# Patient Record
Sex: MALE | Race: WHITE | NOT HISPANIC OR LATINO | Employment: FULL TIME | URBAN - METROPOLITAN AREA
[De-identification: names, ages, dates, MRNs, and addresses within clinical notes are randomized per-mention and may not be internally consistent; named-entity substitution may affect disease eponyms.]

---

## 2018-02-20 ENCOUNTER — HOSPITAL ENCOUNTER (INPATIENT)
Facility: HOSPITAL | Age: 38
LOS: 3 days | Discharge: HOME | End: 2018-02-23
Attending: EMERGENCY MEDICINE | Admitting: INTERNAL MEDICINE

## 2018-02-20 DIAGNOSIS — B16.9 ACUTE HEPATITIS B: Primary | ICD-10-CM

## 2018-02-20 DIAGNOSIS — A63.0 CONDYLOMA: ICD-10-CM

## 2018-02-20 PROBLEM — E86.0 DEHYDRATION: Status: ACTIVE | Noted: 2018-02-20

## 2018-02-20 PROBLEM — F19.10 IV DRUG ABUSE (HCC): Status: ACTIVE | Noted: 2018-02-20

## 2018-02-20 PROBLEM — B17.10 ACUTE HEPATITIS C WITHOUT HEPATIC COMA: Status: ACTIVE | Noted: 2018-02-20

## 2018-02-20 LAB
ALBUMIN SERPL-MCNC: 3.5 G/DL (ref 3.5–5.2)
ALBUMIN/GLOB SERPL: 0.8 G/DL
ALP SERPL-CCNC: 178 U/L (ref 39–117)
ALT SERPL W P-5'-P-CCNC: 935 U/L (ref 1–41)
AMMONIA BLD-SCNC: 50 UMOL/L (ref 16–60)
ANION GAP SERPL CALCULATED.3IONS-SCNC: 9 MMOL/L
AST SERPL-CCNC: 774 U/L (ref 1–40)
BASOPHILS # BLD AUTO: 0.06 10*3/MM3 (ref 0–0.2)
BASOPHILS NFR BLD AUTO: 0.4 % (ref 0–1.5)
BILIRUB SERPL-MCNC: 3.6 MG/DL (ref 0.1–1.2)
BILIRUB UR QL STRIP: NEGATIVE
BUN BLD-MCNC: 16 MG/DL (ref 6–20)
BUN/CREAT SERPL: 16.2 (ref 7–25)
CALCIUM SPEC-SCNC: 9.2 MG/DL (ref 8.6–10.5)
CHLORIDE SERPL-SCNC: 103 MMOL/L (ref 98–107)
CLARITY UR: CLEAR
CO2 SERPL-SCNC: 28 MMOL/L (ref 22–29)
COLOR UR: ABNORMAL
CREAT BLD-MCNC: 0.99 MG/DL (ref 0.76–1.27)
DEPRECATED RDW RBC AUTO: 58 FL (ref 37–54)
EOSINOPHIL # BLD AUTO: 0.23 10*3/MM3 (ref 0–0.7)
EOSINOPHIL NFR BLD AUTO: 1.6 % (ref 0.3–6.2)
ERYTHROCYTE [DISTWIDTH] IN BLOOD BY AUTOMATED COUNT: 17.1 % (ref 11.5–14.5)
GFR SERPL CREATININE-BSD FRML MDRD: 85 ML/MIN/1.73
GLOBULIN UR ELPH-MCNC: 4.2 GM/DL
GLUCOSE BLD-MCNC: 93 MG/DL (ref 65–99)
GLUCOSE UR STRIP-MCNC: NEGATIVE MG/DL
HCT VFR BLD AUTO: 46.3 % (ref 40.4–52.2)
HGB BLD-MCNC: 15.7 G/DL (ref 13.7–17.6)
HGB UR QL STRIP.AUTO: NEGATIVE
IMM GRANULOCYTES # BLD: 0.04 10*3/MM3 (ref 0–0.03)
IMM GRANULOCYTES NFR BLD: 0.3 % (ref 0–0.5)
INR PPP: 1.23 (ref 0.9–1.1)
KETONES UR QL STRIP: NEGATIVE
LEUKOCYTE ESTERASE UR QL STRIP.AUTO: NEGATIVE
LIPASE SERPL-CCNC: 104 U/L (ref 13–60)
LYMPHOCYTES # BLD AUTO: 2.57 10*3/MM3 (ref 0.9–4.8)
LYMPHOCYTES NFR BLD AUTO: 18.2 % (ref 19.6–45.3)
MCH RBC QN AUTO: 31.5 PG (ref 27–32.7)
MCHC RBC AUTO-ENTMCNC: 33.9 G/DL (ref 32.6–36.4)
MCV RBC AUTO: 92.8 FL (ref 79.8–96.2)
MONOCYTES # BLD AUTO: 1.5 10*3/MM3 (ref 0.2–1.2)
MONOCYTES NFR BLD AUTO: 10.6 % (ref 5–12)
NEUTROPHILS # BLD AUTO: 9.71 10*3/MM3 (ref 1.9–8.1)
NEUTROPHILS NFR BLD AUTO: 68.9 % (ref 42.7–76)
NITRITE UR QL STRIP: NEGATIVE
PH UR STRIP.AUTO: 6 [PH] (ref 5–8)
PLATELET # BLD AUTO: 280 10*3/MM3 (ref 140–500)
PMV BLD AUTO: 11.1 FL (ref 6–12)
POTASSIUM BLD-SCNC: 4.5 MMOL/L (ref 3.5–5.2)
PROT SERPL-MCNC: 7.7 G/DL (ref 6–8.5)
PROT UR QL STRIP: NEGATIVE
PROTHROMBIN TIME: 15.3 SECONDS (ref 11.7–14.2)
RBC # BLD AUTO: 4.99 10*6/MM3 (ref 4.6–6)
SODIUM BLD-SCNC: 140 MMOL/L (ref 136–145)
SP GR UR STRIP: 1.02 (ref 1–1.03)
UROBILINOGEN UR QL STRIP: ABNORMAL
WBC NRBC COR # BLD: 14.11 10*3/MM3 (ref 4.5–10.7)

## 2018-02-20 PROCEDURE — 82140 ASSAY OF AMMONIA: CPT | Performed by: NURSE PRACTITIONER

## 2018-02-20 PROCEDURE — 80053 COMPREHEN METABOLIC PANEL: CPT | Performed by: NURSE PRACTITIONER

## 2018-02-20 PROCEDURE — 99284 EMERGENCY DEPT VISIT MOD MDM: CPT

## 2018-02-20 PROCEDURE — 85025 COMPLETE CBC W/AUTO DIFF WBC: CPT | Performed by: NURSE PRACTITIONER

## 2018-02-20 PROCEDURE — 86592 SYPHILIS TEST NON-TREP QUAL: CPT | Performed by: NURSE PRACTITIONER

## 2018-02-20 PROCEDURE — 83690 ASSAY OF LIPASE: CPT | Performed by: NURSE PRACTITIONER

## 2018-02-20 PROCEDURE — 81003 URINALYSIS AUTO W/O SCOPE: CPT | Performed by: NURSE PRACTITIONER

## 2018-02-20 PROCEDURE — 85610 PROTHROMBIN TIME: CPT | Performed by: NURSE PRACTITIONER

## 2018-02-20 RX ORDER — GABAPENTIN 600 MG/1
600 TABLET ORAL 3 TIMES DAILY
COMMUNITY
End: 2018-02-20

## 2018-02-20 RX ORDER — SODIUM CHLORIDE 0.9 % (FLUSH) 0.9 %
10 SYRINGE (ML) INJECTION AS NEEDED
Status: DISCONTINUED | OUTPATIENT
Start: 2018-02-20 | End: 2018-02-23 | Stop reason: HOSPADM

## 2018-02-20 RX ORDER — SODIUM CHLORIDE 0.9 % (FLUSH) 0.9 %
1-10 SYRINGE (ML) INJECTION AS NEEDED
Status: DISCONTINUED | OUTPATIENT
Start: 2018-02-20 | End: 2018-02-23 | Stop reason: HOSPADM

## 2018-02-20 RX ORDER — SODIUM CHLORIDE 9 MG/ML
100 INJECTION, SOLUTION INTRAVENOUS CONTINUOUS
Status: DISCONTINUED | OUTPATIENT
Start: 2018-02-21 | End: 2018-02-22

## 2018-02-20 RX ADMIN — SODIUM CHLORIDE 1000 ML: 9 INJECTION, SOLUTION INTRAVENOUS at 20:30

## 2018-02-20 NOTE — ED TRIAGE NOTES
Pt reports being previous IV drug user- has been clean for 64 days> Pt reports he has been having intermittent jaundice for 7 months, worsening over last 2 weeks. Pt brought lab work from [facility] urgent [facility].

## 2018-02-21 ENCOUNTER — APPOINTMENT (OUTPATIENT)
Dept: CARDIOLOGY | Facility: HOSPITAL | Age: 38
End: 2018-02-21
Attending: INTERNAL MEDICINE

## 2018-02-21 ENCOUNTER — APPOINTMENT (OUTPATIENT)
Dept: ULTRASOUND IMAGING | Facility: HOSPITAL | Age: 38
End: 2018-02-21

## 2018-02-21 ENCOUNTER — APPOINTMENT (OUTPATIENT)
Dept: CARDIOLOGY | Facility: HOSPITAL | Age: 38
End: 2018-02-21
Attending: SURGERY

## 2018-02-21 ENCOUNTER — APPOINTMENT (OUTPATIENT)
Dept: CT IMAGING | Facility: HOSPITAL | Age: 38
End: 2018-02-21

## 2018-02-21 PROBLEM — I87.1 INFERIOR VENA CAVAL STENOSIS: Status: ACTIVE | Noted: 2018-02-21

## 2018-02-21 LAB
ALBUMIN SERPL-MCNC: 3 G/DL (ref 3.5–5.2)
ALBUMIN/GLOB SERPL: 0.8 G/DL
ALP SERPL-CCNC: 160 U/L (ref 39–117)
ALT SERPL W P-5'-P-CCNC: 730 U/L (ref 1–41)
ANION GAP SERPL CALCULATED.3IONS-SCNC: 12 MMOL/L
AORTIC ARCH: 2.6 CM
AORTIC DIMENSIONLESS INDEX: 0.7 (DI)
AST SERPL-CCNC: 593 U/L (ref 1–40)
BASOPHILS # BLD AUTO: 0.06 10*3/MM3 (ref 0–0.2)
BASOPHILS NFR BLD AUTO: 0.5 % (ref 0–1.5)
BH CV ECHO MEAS - ACS: 2.2 CM
BH CV ECHO MEAS - AO MEAN PG (FULL): 1 MMHG
BH CV ECHO MEAS - AO MEAN PG: 4 MMHG
BH CV ECHO MEAS - AO ROOT AREA (BSA CORRECTED): 1.6
BH CV ECHO MEAS - AO ROOT AREA: 7.1 CM^2
BH CV ECHO MEAS - AO ROOT DIAM: 3 CM
BH CV ECHO MEAS - AO V2 MAX: 122 CM/SEC
BH CV ECHO MEAS - AO V2 MEAN: 96 CM/SEC
BH CV ECHO MEAS - AO V2 VTI: 30 CM
BH CV ECHO MEAS - AVA(I,A): 2.3 CM^2
BH CV ECHO MEAS - AVA(I,D): 2.3 CM^2
BH CV ECHO MEAS - BSA(HAYCOCK): 1.9 M^2
BH CV ECHO MEAS - BSA: 1.9 M^2
BH CV ECHO MEAS - BZI_BMI: 22.1 KILOGRAMS/M^2
BH CV ECHO MEAS - BZI_METRIC_HEIGHT: 177.8 CM
BH CV ECHO MEAS - BZI_METRIC_WEIGHT: 69.9 KG
BH CV ECHO MEAS - CONTRAST EF (2CH): 43.5 ML/M^2
BH CV ECHO MEAS - CONTRAST EF 4CH: 45.3 ML/M^2
BH CV ECHO MEAS - EDV(CUBED): 85.2 ML
BH CV ECHO MEAS - EDV(MOD-SP2): 92 ML
BH CV ECHO MEAS - EDV(MOD-SP4): 86 ML
BH CV ECHO MEAS - EDV(TEICH): 87.7 ML
BH CV ECHO MEAS - EF(CUBED): 53.9 %
BH CV ECHO MEAS - EF(MOD-SP2): 43.5 %
BH CV ECHO MEAS - EF(MOD-SP4): 58 %
BH CV ECHO MEAS - EF(TEICH): 45.9 %
BH CV ECHO MEAS - ESV(CUBED): 39.3 ML
BH CV ECHO MEAS - ESV(MOD-SP2): 52 ML
BH CV ECHO MEAS - ESV(MOD-SP4): 47 ML
BH CV ECHO MEAS - ESV(TEICH): 47.4 ML
BH CV ECHO MEAS - FS: 22.7 %
BH CV ECHO MEAS - IVS/LVPW: 0.9
BH CV ECHO MEAS - IVSD: 0.9 CM
BH CV ECHO MEAS - LA DIMENSION: 2.8 CM
BH CV ECHO MEAS - LA/AO: 0.93
BH CV ECHO MEAS - LAT PEAK E' VEL: 12 CM/SEC
BH CV ECHO MEAS - LV DIASTOLIC VOL/BSA (35-75): 46 ML/M^2
BH CV ECHO MEAS - LV MASS(C)D: 137.8 GRAMS
BH CV ECHO MEAS - LV MASS(C)DI: 73.8 GRAMS/M^2
BH CV ECHO MEAS - LV MEAN PG: 3 MMHG
BH CV ECHO MEAS - LV SYSTOLIC VOL/BSA (12-30): 25.2 ML/M^2
BH CV ECHO MEAS - LV V1 MAX: 104 CM/SEC
BH CV ECHO MEAS - LV V1 MEAN: 77.1 CM/SEC
BH CV ECHO MEAS - LV V1 VTI: 20.1 CM
BH CV ECHO MEAS - LVIDD: 4.4 CM
BH CV ECHO MEAS - LVIDS: 3.4 CM
BH CV ECHO MEAS - LVLD AP2: 8.5 CM
BH CV ECHO MEAS - LVLD AP4: 8.7 CM
BH CV ECHO MEAS - LVLS AP2: 7.2 CM
BH CV ECHO MEAS - LVLS AP4: 6.8 CM
BH CV ECHO MEAS - LVOT AREA (M): 3.5 CM^2
BH CV ECHO MEAS - LVOT AREA: 3.5 CM^2
BH CV ECHO MEAS - LVOT DIAM: 2.1 CM
BH CV ECHO MEAS - LVPWD: 1 CM
BH CV ECHO MEAS - MED PEAK E' VEL: 11 CM/SEC
BH CV ECHO MEAS - MV A DUR: 0.1 SEC
BH CV ECHO MEAS - MV A MAX VEL: 56.3 CM/SEC
BH CV ECHO MEAS - MV DEC SLOPE: 278 CM/SEC^2
BH CV ECHO MEAS - MV DEC TIME: 0.2 SEC
BH CV ECHO MEAS - MV E MAX VEL: 79.5 CM/SEC
BH CV ECHO MEAS - MV E/A: 1.4
BH CV ECHO MEAS - MV MEAN PG: 1 MMHG
BH CV ECHO MEAS - MV P1/2T MAX VEL: 81.4 CM/SEC
BH CV ECHO MEAS - MV P1/2T: 85.8 MSEC
BH CV ECHO MEAS - MV V2 MEAN: 56.8 CM/SEC
BH CV ECHO MEAS - MV V2 VTI: 26.5 CM
BH CV ECHO MEAS - MVA P1/2T LCG: 2.7 CM^2
BH CV ECHO MEAS - MVA(P1/2T): 2.6 CM^2
BH CV ECHO MEAS - MVA(VTI): 2.6 CM^2
BH CV ECHO MEAS - PA ACC SLOPE: 0 CM/SEC^2
BH CV ECHO MEAS - PA ACC TIME: 0.1 SEC
BH CV ECHO MEAS - PA MAX PG (FULL): 1 MMHG
BH CV ECHO MEAS - PA MAX PG: 2.3 MMHG
BH CV ECHO MEAS - PA PR(ACCEL): 36.3 MMHG
BH CV ECHO MEAS - PA V2 MAX: 75.6 CM/SEC
BH CV ECHO MEAS - PULM A REVS DUR: 0.09 SEC
BH CV ECHO MEAS - PULM A REVS VEL: 34.1 CM/SEC
BH CV ECHO MEAS - PULM DIAS VEL: 50.8 CM/SEC
BH CV ECHO MEAS - PULM S/D: 1.2
BH CV ECHO MEAS - PULM SYS VEL: 61.7 CM/SEC
BH CV ECHO MEAS - PVA(V,A): 1.9 CM^2
BH CV ECHO MEAS - PVA(V,D): 1.9 CM^2
BH CV ECHO MEAS - QP/QS: 0.45
BH CV ECHO MEAS - RAP SYSTOLE: 3 MMHG
BH CV ECHO MEAS - RV MAX PG: 1.2 MMHG
BH CV ECHO MEAS - RV MEAN PG: 1 MMHG
BH CV ECHO MEAS - RV V1 MAX: 55.6 CM/SEC
BH CV ECHO MEAS - RV V1 MEAN: 40 CM/SEC
BH CV ECHO MEAS - RV V1 VTI: 12.2 CM
BH CV ECHO MEAS - RVOT AREA: 2.5 CM^2
BH CV ECHO MEAS - RVOT DIAM: 1.8 CM
BH CV ECHO MEAS - RVSP: 19 MMHG
BH CV ECHO MEAS - SI(AO): 113.5 ML/M^2
BH CV ECHO MEAS - SI(CUBED): 24.6 ML/M^2
BH CV ECHO MEAS - SI(LVOT): 37.3 ML/M^2
BH CV ECHO MEAS - SI(MOD-SP2): 21.4 ML/M^2
BH CV ECHO MEAS - SI(MOD-SP4): 20.9 ML/M^2
BH CV ECHO MEAS - SI(TEICH): 21.6 ML/M^2
BH CV ECHO MEAS - SUP REN AO DIAM: 1.58 CM
BH CV ECHO MEAS - SV(AO): 212.1 ML
BH CV ECHO MEAS - SV(CUBED): 45.9 ML
BH CV ECHO MEAS - SV(LVOT): 69.6 ML
BH CV ECHO MEAS - SV(MOD-SP2): 40 ML
BH CV ECHO MEAS - SV(MOD-SP4): 39 ML
BH CV ECHO MEAS - SV(RVOT): 31 ML
BH CV ECHO MEAS - SV(TEICH): 40.3 ML
BH CV ECHO MEAS - TAPSE (>1.6): 2.4 CM2
BH CV ECHO MEAS - TR MAX VEL: 202 CM/SEC
BH CV LOWER VASCULAR LEFT COMMON FEMORAL AUGMENT: NORMAL
BH CV LOWER VASCULAR LEFT COMMON FEMORAL COMPETENT: NORMAL
BH CV LOWER VASCULAR LEFT COMMON FEMORAL COMPRESS: NORMAL
BH CV LOWER VASCULAR LEFT COMMON FEMORAL PHASIC: NORMAL
BH CV LOWER VASCULAR LEFT COMMON FEMORAL SPONT: NORMAL
BH CV LOWER VASCULAR LEFT DISTAL FEMORAL COMPRESS: NORMAL
BH CV LOWER VASCULAR LEFT GASTRONEMIUS COMPRESS: NORMAL
BH CV LOWER VASCULAR LEFT GREATER SAPH AK COMPRESS: NORMAL
BH CV LOWER VASCULAR LEFT GREATER SAPH BK COMPRESS: NORMAL
BH CV LOWER VASCULAR LEFT MID FEMORAL AUGMENT: NORMAL
BH CV LOWER VASCULAR LEFT MID FEMORAL COMPETENT: NORMAL
BH CV LOWER VASCULAR LEFT MID FEMORAL COMPRESS: NORMAL
BH CV LOWER VASCULAR LEFT MID FEMORAL PHASIC: NORMAL
BH CV LOWER VASCULAR LEFT MID FEMORAL SPONT: NORMAL
BH CV LOWER VASCULAR LEFT PERONEAL COMPRESS: NORMAL
BH CV LOWER VASCULAR LEFT POPLITEAL AUGMENT: NORMAL
BH CV LOWER VASCULAR LEFT POPLITEAL COMPETENT: NORMAL
BH CV LOWER VASCULAR LEFT POPLITEAL COMPRESS: NORMAL
BH CV LOWER VASCULAR LEFT POPLITEAL PHASIC: NORMAL
BH CV LOWER VASCULAR LEFT POPLITEAL SPONT: NORMAL
BH CV LOWER VASCULAR LEFT POSTERIOR TIBIAL COMPRESS: NORMAL
BH CV LOWER VASCULAR LEFT PROXIMAL FEMORAL COMPRESS: NORMAL
BH CV LOWER VASCULAR LEFT SAPHENOFEMORAL JUNCTION AUGMENT: NORMAL
BH CV LOWER VASCULAR LEFT SAPHENOFEMORAL JUNCTION COMPETENT: NORMAL
BH CV LOWER VASCULAR LEFT SAPHENOFEMORAL JUNCTION COMPRESS: NORMAL
BH CV LOWER VASCULAR LEFT SAPHENOFEMORAL JUNCTION PHASIC: NORMAL
BH CV LOWER VASCULAR LEFT SAPHENOFEMORAL JUNCTION SPONT: NORMAL
BH CV LOWER VASCULAR RIGHT COMMON FEMORAL AUGMENT: NORMAL
BH CV LOWER VASCULAR RIGHT COMMON FEMORAL COMPETENT: NORMAL
BH CV LOWER VASCULAR RIGHT COMMON FEMORAL COMPRESS: NORMAL
BH CV LOWER VASCULAR RIGHT COMMON FEMORAL PHASIC: NORMAL
BH CV LOWER VASCULAR RIGHT COMMON FEMORAL SPONT: NORMAL
BH CV LOWER VASCULAR RIGHT DISTAL FEMORAL COMPRESS: NORMAL
BH CV LOWER VASCULAR RIGHT GASTRONEMIUS COMPRESS: NORMAL
BH CV LOWER VASCULAR RIGHT GREATER SAPH AK COMPRESS: NORMAL
BH CV LOWER VASCULAR RIGHT GREATER SAPH BK COMPRESS: NORMAL
BH CV LOWER VASCULAR RIGHT MID FEMORAL AUGMENT: NORMAL
BH CV LOWER VASCULAR RIGHT MID FEMORAL COMPETENT: NORMAL
BH CV LOWER VASCULAR RIGHT MID FEMORAL COMPRESS: NORMAL
BH CV LOWER VASCULAR RIGHT MID FEMORAL PHASIC: NORMAL
BH CV LOWER VASCULAR RIGHT MID FEMORAL SPONT: NORMAL
BH CV LOWER VASCULAR RIGHT PERONEAL COMPRESS: NORMAL
BH CV LOWER VASCULAR RIGHT POPLITEAL AUGMENT: NORMAL
BH CV LOWER VASCULAR RIGHT POPLITEAL COMPETENT: NORMAL
BH CV LOWER VASCULAR RIGHT POPLITEAL COMPRESS: NORMAL
BH CV LOWER VASCULAR RIGHT POPLITEAL PHASIC: NORMAL
BH CV LOWER VASCULAR RIGHT POPLITEAL SPONT: NORMAL
BH CV LOWER VASCULAR RIGHT POSTERIOR TIBIAL COMPRESS: NORMAL
BH CV LOWER VASCULAR RIGHT PROXIMAL FEMORAL COMPRESS: NORMAL
BH CV LOWER VASCULAR RIGHT SAPHENOFEMORAL JUNCTION AUGMENT: NORMAL
BH CV LOWER VASCULAR RIGHT SAPHENOFEMORAL JUNCTION COMPETENT: NORMAL
BH CV LOWER VASCULAR RIGHT SAPHENOFEMORAL JUNCTION COMPRESS: NORMAL
BH CV LOWER VASCULAR RIGHT SAPHENOFEMORAL JUNCTION PHASIC: NORMAL
BH CV LOWER VASCULAR RIGHT SAPHENOFEMORAL JUNCTION SPONT: NORMAL
BH CV VAS BP RIGHT ARM: NORMAL MMHG
BH CV XLRA - RV BASE: 2.5 CM
BH CV XLRA - TDI S': 13 CM/SEC
BILIRUB SERPL-MCNC: 2.6 MG/DL (ref 0.1–1.2)
BUN BLD-MCNC: 13 MG/DL (ref 6–20)
BUN/CREAT SERPL: 13.8 (ref 7–25)
CALCIUM SPEC-SCNC: 8.3 MG/DL (ref 8.6–10.5)
CHLORIDE SERPL-SCNC: 103 MMOL/L (ref 98–107)
CO2 SERPL-SCNC: 24 MMOL/L (ref 22–29)
CREAT BLD-MCNC: 0.94 MG/DL (ref 0.76–1.27)
DEPRECATED RDW RBC AUTO: 62.6 FL (ref 37–54)
E/E' RATIO: 7
EOSINOPHIL # BLD AUTO: 0.27 10*3/MM3 (ref 0–0.7)
EOSINOPHIL NFR BLD AUTO: 2.4 % (ref 0.3–6.2)
ERYTHROCYTE [DISTWIDTH] IN BLOOD BY AUTOMATED COUNT: 18 % (ref 11.5–14.5)
GFR SERPL CREATININE-BSD FRML MDRD: 90 ML/MIN/1.73
GLOBULIN UR ELPH-MCNC: 3.6 GM/DL
GLUCOSE BLD-MCNC: 107 MG/DL (ref 65–99)
HCT VFR BLD AUTO: 43.1 % (ref 40.4–52.2)
HGB BLD-MCNC: 14.6 G/DL (ref 13.7–17.6)
IMM GRANULOCYTES # BLD: 0.03 10*3/MM3 (ref 0–0.03)
IMM GRANULOCYTES NFR BLD: 0.3 % (ref 0–0.5)
INR PPP: 1.29 (ref 0.9–1.1)
LEFT ATRIUM VOLUME INDEX: 19 ML/M2
LV EF 2D ECHO EST: 58 %
LYMPHOCYTES # BLD AUTO: 2.4 10*3/MM3 (ref 0.9–4.8)
LYMPHOCYTES NFR BLD AUTO: 21.3 % (ref 19.6–45.3)
MAXIMAL PREDICTED HEART RATE: 182 BPM
MCH RBC QN AUTO: 32.1 PG (ref 27–32.7)
MCHC RBC AUTO-ENTMCNC: 33.9 G/DL (ref 32.6–36.4)
MCV RBC AUTO: 94.7 FL (ref 79.8–96.2)
MONOCYTES # BLD AUTO: 1.16 10*3/MM3 (ref 0.2–1.2)
MONOCYTES NFR BLD AUTO: 10.3 % (ref 5–12)
NEUTROPHILS # BLD AUTO: 7.37 10*3/MM3 (ref 1.9–8.1)
NEUTROPHILS NFR BLD AUTO: 65.2 % (ref 42.7–76)
PLATELET # BLD AUTO: 259 10*3/MM3 (ref 140–500)
PMV BLD AUTO: 12.3 FL (ref 6–12)
POTASSIUM BLD-SCNC: 3.6 MMOL/L (ref 3.5–5.2)
PROT SERPL-MCNC: 6.6 G/DL (ref 6–8.5)
PROTHROMBIN TIME: 15.8 SECONDS (ref 11.7–14.2)
RBC # BLD AUTO: 4.55 10*6/MM3 (ref 4.6–6)
RPR SER QL: NORMAL
SODIUM BLD-SCNC: 139 MMOL/L (ref 136–145)
STRESS TARGET HR: 155 BPM
WBC NRBC COR # BLD: 11.29 10*3/MM3 (ref 4.5–10.7)

## 2018-02-21 PROCEDURE — 93306 TTE W/DOPPLER COMPLETE: CPT | Performed by: INTERNAL MEDICINE

## 2018-02-21 PROCEDURE — 93306 TTE W/DOPPLER COMPLETE: CPT

## 2018-02-21 PROCEDURE — 74175 CTA ABDOMEN W/CONTRAST: CPT

## 2018-02-21 PROCEDURE — 87902 NFCT AGT GNTYP ALYS HEP C: CPT | Performed by: INTERNAL MEDICINE

## 2018-02-21 PROCEDURE — 80053 COMPREHEN METABOLIC PANEL: CPT | Performed by: INTERNAL MEDICINE

## 2018-02-21 PROCEDURE — 0 IOPAMIDOL 61 % SOLUTION: Performed by: INTERNAL MEDICINE

## 2018-02-21 PROCEDURE — 99253 IP/OBS CNSLTJ NEW/EST LOW 45: CPT | Performed by: INTERNAL MEDICINE

## 2018-02-21 PROCEDURE — 76705 ECHO EXAM OF ABDOMEN: CPT

## 2018-02-21 PROCEDURE — 93970 EXTREMITY STUDY: CPT

## 2018-02-21 PROCEDURE — 85025 COMPLETE CBC W/AUTO DIFF WBC: CPT | Performed by: INTERNAL MEDICINE

## 2018-02-21 PROCEDURE — 85610 PROTHROMBIN TIME: CPT | Performed by: INTERNAL MEDICINE

## 2018-02-21 PROCEDURE — 25010000002 PERFLUTREN (DEFINITY) 8.476 MG IN SODIUM CHLORIDE 0.9 % 10 ML INJECTION: Performed by: INTERNAL MEDICINE

## 2018-02-21 PROCEDURE — 87522 HEPATITIS C REVRS TRNSCRPJ: CPT | Performed by: INTERNAL MEDICINE

## 2018-02-21 RX ORDER — NICOTINE 21 MG/24HR
1 PATCH, TRANSDERMAL 24 HOURS TRANSDERMAL EVERY 24 HOURS
Status: DISCONTINUED | OUTPATIENT
Start: 2018-02-21 | End: 2018-02-22

## 2018-02-21 RX ADMIN — PERFLUTREN 2 ML: 6.52 INJECTION, SUSPENSION INTRAVENOUS at 18:15

## 2018-02-21 RX ADMIN — NICOTINE 1 PATCH: 14 PATCH, EXTENDED RELEASE TRANSDERMAL at 11:02

## 2018-02-21 RX ADMIN — SODIUM CHLORIDE 100 ML/HR: 9 INJECTION, SOLUTION INTRAVENOUS at 00:36

## 2018-02-21 RX ADMIN — SODIUM CHLORIDE 100 ML/HR: 9 INJECTION, SOLUTION INTRAVENOUS at 19:36

## 2018-02-21 RX ADMIN — IOPAMIDOL 95 ML: 612 INJECTION, SOLUTION INTRAVENOUS at 14:15

## 2018-02-21 NOTE — PAYOR COMM NOTE
"Casimiro Giraldo Jr. (37 y.o. Male)     PLEASE SEE ATTACHED CLINICAL REVIEW ON PATIENT. PT. WAS ADMITTED TO [facility] ON 2/20/18 TO A MONITORED TELEM FLOOR.    PLEASE CALL   OR  [phone] WITH INPT AUTH AND DAYS APPROVED.     THANK YOU    AUGIE CULP, JOSE CARLOS, CCP     Date of Birth Social Security Number Address Home Phone MRN    1980  [address] [place] [address] [phone] [id number]    Congregation Marital Status          None        Admission Date Admission Type Admitting Provider Attending Provider Department, Room/Bed    2/20/18 Emergency Stas Blackman MD Schmitt, Christopher E, MD [address], 506/1    Discharge Date Discharge Disposition Discharge Destination                      Attending Provider: Isai Pelletier MD     Allergies:  No Known Allergies    Isolation:  None   Infection:  None   Code Status:  FULL    Ht:  177.8 cm (70\")   Wt:  69.9 kg (154 lb)    Admission Cmt:  None   Principal Problem:  Acute hepatitis B [B16.9]                 Active Insurance as of 2/20/2018     Primary Coverage     Payor Plan Insurance Group Employer/Plan Group    [employer] [place] [employer]ICA[place]      Payor Plan Address Payor Plan Phone Number Effective From Effective To    [address] [address] [phone] 2/19/2018     [place] [address]       Subscriber Name Subscriber Birth Date Member ID       CASIMIRO GIRALDO JR. 1980 [id number]                 Emergency Contacts      (Rel.) Home Phone Work Phone Mobile Phone    Keila Giraldo (Mother) [phone] -- --               History & Physical      Stas Blackman MD at 2/20/2018 10:48 PM          A Admission H&P    Patient Care Team:  No Known Provider as PCP - General    Chief complaint: Fatigue, jaundice, dark urine    History of Present Illness    This is a 37-year-old male who has a history of IV drug use and states that he has been clean for 63 days.  He " has noticed increasing fatigue, jaundice with yellowing of his eyes, and dark urine worsening over the past 5 days.  He went to an urgent care clinic yesterday and had lab work performed including liver function panel which showed markedly elevated LFTs, and acute hepatitis panel which was positive for acute hepatitis B and C.  He states they instructed him to go to the emergency room for further evaluation which is the reason for his presentation this evening.  He has been having some nausea over the past 24 hours but no vomiting.  He denies abdominal pain or diarrhea.  He denies fevers or chills.  No shortness of breath, cough, chest pain or palpitations.  He states he does have a history of HPV with worsening condyloma at the base of his penis which has been present for about 9 years.  He states it has gotten markedly worse over the past week.  He denies any urinary symptoms.  He states that about 15 years ago he had chlamydia and syphilis and was treated for both of those.    History reviewed. No pertinent past medical history.  Past Surgical History:   Procedure Laterality Date   • HERNIA REPAIR     • TONSILLECTOMY       History reviewed. No pertinent family history.  Social History   Substance Use Topics   • Smoking status: Current Every Day Smoker   • Smokeless tobacco: Never Used   • Alcohol use No     No prescriptions prior to admission.      Allergies:  Review of patient's allergies indicates no known allergies.    Review of Systems   Constitutional: Positive for fatigue. Negative for chills and fever.   HENT: Negative for congestion and sore throat.    Eyes: Negative for visual disturbance.   Respiratory: Negative for cough, chest tightness, shortness of breath and wheezing.    Cardiovascular: Negative for chest pain, palpitations and leg swelling.   Gastrointestinal: Positive for nausea. Negative for abdominal distention, abdominal pain, diarrhea and vomiting.   Endocrine: Negative for polydipsia and  polyuria.   Genitourinary: Positive for decreased urine volume and penile pain. Negative for difficulty urinating, dysuria, frequency and urgency.        Worsening condyloma around the base of his penis   Musculoskeletal: Negative for arthralgias and myalgias.   Skin: Negative for color change and rash.   Neurological: Negative for dizziness and light-headedness.        PHYSICAL EXAM    Vital Signs  tMax 24 hrs:  Temp (24hrs), Av °F (36.7 °C), Min:97.8 °F (36.6 °C), Max:98.2 °F (36.8 °C)    Vitals Ranges:  Temp:  [97.8 °F (36.6 °C)-98.2 °F (36.8 °C)] 98.2 °F (36.8 °C)  Heart Rate:  [] 74  Resp:  [17-18] 17  BP: (110-126)/(74-79) 126/77    Physical Exam   Constitutional: He is oriented to person, place, and time. He appears well-developed and well-nourished. No distress.   HENT:   Head: Normocephalic and atraumatic.   Extremely poor dentition   Eyes: EOM are normal. Pupils are equal, round, and reactive to light. Scleral icterus is present.   Neck: Neck supple. No tracheal deviation present.   Cardiovascular: Normal rate and regular rhythm.  Exam reveals gallop.    No murmur heard.  Pulmonary/Chest: Effort normal. No respiratory distress. He has no wheezes.   Abdominal: Soft. Bowel sounds are normal. He exhibits no distension. There is no tenderness.   Musculoskeletal: He exhibits no edema or tenderness.   Neurological: He is alert and oriented to person, place, and time. No cranial nerve deficit.   Skin: Skin is warm and dry.   Very large condyloma which is nearly circumferential around the base of his penis.  A foul odor emanates from this area.   Nursing note and vitals reviewed.       I reviewed the patient's new clinical results.      Principal Problem:    Acute hepatitis B  Active Problems:    Hepatitis C    Dehydration    IV drug abuse    Condyloma acuminata    HPV (human papilloma virus) anogenital infection  Heart gallop    Assessment & Plan    The patient will be admitted.  Gastroenterology  consultation for acute hepatitis B and C.  Monitor daily liver function tests.  We'll check a right upper quadrant ultrasound.  Check hepatitis C viral load and genotype.  He has a heart gallop but I do not appreciate any distinct murmur.  Given his history of IV drug abuse I will check an echocardiogram.  Repeat white blood cell count in the morning.  The condyloma around the base of his penis is quite large and he states has been rapidly worsening.  I will ask urology to see him for recommendations regarding treatment of this.  Continue supportive care overnight tonight with additional plans based on his clinical course.    I discussed the patients findings and my recommendations with patient and family    Stas Blackman MD  02/20/18  10:49 PM             Electronically signed by Stas Blackman MD at 2/20/2018 10:59 PM           Emergency Department Notes      Natalie Owens RN at 2/20/2018  6:30 PM          Pt reports being previous IV drug user- has been clean for 64 days> Pt reports he has been having intermittent jaundice for 7 months, worsening over last 2 weeks. Pt brought lab work from [facility] urgent [facility].     Electronically signed by Natalie Owens RN at 2/20/2018  6:31 PM      Natalie Owens RN at 2/20/2018  6:31 PM          Pt reports urine has also been increasing in brown color for several weeks     Electronically signed by Natalie Owens RN at 2/20/2018  6:32 PM      SHANE Pressley at 2/20/2018  7:20 PM          EMERGENCY DEPARTMENT ENCOUNTER    Room Number:  40/40  Date seen:  2/20/2018  Time seen: 7:20 PM  PCP: No Known Provider   History provided by- patient, family    HPI:  Chief complaint: generalized weakness  Context:Casimiro Herrera Jr. is a 37 y.o. male who states that he has past hx of IV drug use (heroin, methamphetamine, cocaine) and has practiced sobriety for about 64 days. He presents to the ED with generalized weakness that started a few weeks  "ago. He has also had fatigue, nausea, dark urine, intermittent blurred vision, jaundiced skin, and intermittent lightheadedness. He denies vomiting, diarrhea, abd pain, pain and difficulty with urination, chest pain, trouble breathing, BLE swelling, headache, focal weakness, numbness, fevers, and chills. He reports that he was [facility], underwent hepatitis panel that was positive for hepatitis B and C, HIV panel that was negative, and had elevated LFTs; consequently, he was referred to ED for further evaluation. Per family, pt has appointment with hepatologist on 3/8/18 and is currently considering undergoing rehab. Pt has no other complaints at this time.     Timing: constant  Duration: started a few weeks ago  Location: generalized  Radiation: none  Quality: \"weak all over\"  Intensity/Severity: moderate   Associated Symptoms: fatigue, nausea, dark urine, intermittent blurred vision, jaundiced skin, intermittent lightheadedness   Aggravating Factors: activity  Alleviating Factors: rest  Previous Episodes: none  Treatment before arrival: Pt reports that he was seen [facility] yesterday, underwent hepatitis panel that was positive for hepatitis B and C, HIV panel that was negative, and had elevated LFTs; consequently, he was referred to ED for further evaluation.    MEDICAL RECORD REVIEW  Pt was seen yesterday at [facility] for confusion, fatigue, and jaundiced skin. At the time, AST was 988, ALT was 1117, total bili was 5.1, alk phos was 174, HIV panel was nonreactive, and hepatitis panel was positive for hepatitis B and C.     ALLERGIES  Review of patient's allergies indicates no known allergies.    PAST MEDICAL HISTORY  Active Ambulatory Problems     Diagnosis Date Noted   • No Active Ambulatory Problems     Resolved Ambulatory Problems     Diagnosis Date Noted   • No Resolved Ambulatory Problems     No Additional Past Medical History       PAST SURGICAL HISTORY  Past Surgical History:   Procedure Laterality " Date   • HERNIA REPAIR     • TONSILLECTOMY         FAMILY HISTORY  History reviewed. No pertinent family history.    SOCIAL HISTORY  Social History     Social History   • Marital status:      Spouse name: N/A   • Number of children: N/A   • Years of education: N/A     Occupational History   • Not on file.     Social History Main Topics   • Smoking status: Current Every Day Smoker   • Smokeless tobacco: Never Used   • Alcohol use No   • Drug use: Not on file   • Sexual activity: Not on file     Other Topics Concern   • Not on file     Social History Narrative       REVIEW OF SYSTEMS  Review of Systems   Constitutional: Positive for fatigue. Negative for chills and fever.   HENT: Negative for congestion, rhinorrhea and sore throat.    Eyes: Positive for visual disturbance (intermittent blurred vision ). Negative for pain.   Respiratory: Negative for cough and shortness of breath.    Cardiovascular: Negative for chest pain and palpitations.   Gastrointestinal: Positive for nausea. Negative for abdominal pain, diarrhea and vomiting.   Endocrine: Negative.    Genitourinary: Negative for difficulty urinating and dysuria.        Dark urine   Musculoskeletal: Negative for myalgias.   Skin: Positive for color change (jaundiced skin).   Neurological: Positive for dizziness (intermittent lightheadedness), weakness (generalized weakness, no focal weakness) and light-headedness (intermittent). Negative for numbness and headaches.   Psychiatric/Behavioral: Negative.        PHYSICAL EXAM  ED Triage Vitals   Temp Heart Rate Resp BP SpO2   02/20/18 1812 02/20/18 1812 02/20/18 1812 02/20/18 1829 02/20/18 1812   97.8 °F (36.6 °C) 108 18 122/79 98 % WNL      Temp src Heart Rate Source Patient Position BP Location FiO2 (%)   02/20/18 1812 02/20/18 1812 02/20/18 1829 02/20/18 1829 --   Tympanic Monitor Sitting Left arm      Physical Exam   Constitutional: He is oriented to person, place, and time. He appears jaundiced (slight). No  distress.   HENT:   Head: Normocephalic and atraumatic.   Eyes: EOM are normal. Pupils are equal, round, and reactive to light. Scleral icterus is present.   Neck: Normal range of motion. Neck supple.   Cardiovascular: Normal rate, regular rhythm, S1 normal, S2 normal and normal heart sounds.  Exam reveals no gallop and no friction rub.    No murmur heard.  Pulmonary/Chest: Effort normal. No respiratory distress. He has no wheezes. He has no rales. He exhibits no tenderness.   Abdominal: Soft. There is no tenderness. There is no rebound and no guarding.   No RUQ tenderness   Genitourinary:   Genitourinary Comments: Male tech at pt's bedside during male  exam.  There is a large condyloma which originates at shaft of penis, this is larger on right side of base of penis than left.  At the base on right side there is some excoriated area with moist, reddened skin and odor noted.  Pt states this condyloma has been present for 9 years.  He denies any condylomas around his rectum; rectal exam deferred.    Musculoskeletal: Normal range of motion. He exhibits no edema (no BLE edema) or deformity.   Lymphadenopathy:     He has no cervical adenopathy.   Neurological: He is alert and oriented to person, place, and time.   Skin: Skin is warm, dry and intact.   Slightly jaundiced   Psychiatric: Affect normal.   Nursing note and vitals reviewed.      I ordered the above labs and reviewed the results      MEDICATIONS GIVEN IN ER  Medications   sodium chloride 0.9 % flush 10 mL (not administered)   sodium chloride 0.9 % bolus 1,000 mL (1,000 mL Intravenous New Bag 2/20/18 2030)         PROCEDURES  Procedures    COURSE & MEDICAL DECISION MAKING  Pertinent Labs that were ordered and reviewed are noted above.  Results were reviewed/discussed with the patient and they were also made aware of online assess.  Pt also made aware that some labs, such as cultures, will not be resulted during ER visit and follow up with PMD is necessary.  "    PROGRESS AND CONSULTS    Progress Notes:    ED Course     1934- Pt now states that he has \"an STD (HPV)\" and has been having recent drainage and odor from chronic penile lesion (present for about 9 years). He requests  exam. Performed male  exam with male tech at pt's bedside. See physical exam for details.      1943- Ordered IV fluids for hydration. Ordered ammonia level, blood work, UA, lipase, PT with INR, and RPR for further evaluation.     2126- Sent call out to [facility] for admission.     2139- Discussed case with Dr Blackman ([facility] hospitalist)  Reviewed history, exam, results and treatments.  Discussed concerns and plan of care. Dr Blackman accepts pt to be admitted to telemetry.    2143- Rechecked pt. He is resting comfortably and is in no acute distress. Discussed with pt and family about all pertinent results including mildly elevated WBC count, elevated LFTs, unremarkable UA, and normal ammonia level. Discussed pt admission for further care and observation. Pt and family verbalize understanding and agreement with plan.     2145- Reviewed pt's history and workup with Dr. Byrd.  After a bedside evaluation; Dr Byrd agrees with the plan of care.     Based on the patient's lab findings and presenting symptoms, the doctor and I feel it is appropriate to admit the patient for further management, evaluation, and treatment.  I have discussed this with the admitting team.  I have also discussed this with the patient/family.  They are in agreement with admission.        Disposition vitals:  /78  Pulse 78  Temp 97.8 °F (36.6 °C) (Tympanic)   Resp 18  Ht 177.8 cm (70\")  Wt 69.9 kg (154 lb)  SpO2 97%  BMI 22.1 kg/m2      DIAGNOSIS  Final diagnoses:   Acute hepatitis B   Condyloma         Documentation assistance provided by bhavesh Morales for SHANE Allan.  Information recorded by the bhavesh was done at my direction and has been verified and validated by me.  Electronically signed by Karrie " Carmen on 2/20/2018 at time 9:44 PM     Karrie Morales  02/20/18 2200       SHANE Pressley  02/21/18 0006       Electronically signed by SHANE Pressley at 2/21/2018 12:06 AM        Intake & Output (last day)       02/20 0701 - 02/21 0700 02/21 0701 - 02/22 0700    I.V. (mL/kg) 1503 (21.5)     IV Piggyback 1000     Total Intake(mL/kg) 2503 (35.8)     Net +2503                Lab Results (all)     Procedure Component Value Units Date/Time    RPR [[id number]] Collected:  02/20/18 2023    Specimen:  Blood Updated:  02/20/18 2028    CBC & Differential [[id number]] Collected:  02/20/18 2024    Specimen:  Blood Updated:  02/20/18 2033    Narrative:       CBC Auto Differential [[id number]]  (Abnormal) Collected:  02/20/18 2024    Specimen:  Blood Updated:  02/20/18 2033     WBC 14.11 (H) 10*3/mm3      RBC 4.99 10*6/mm3      Hemoglobin 15.7 g/dL      Hematocrit 46.3 %      MCV 92.8 fL      MCH 31.5 pg      MCHC 33.9 g/dL      RDW 17.1 (H) %      RDW-SD 58.0 (H) fl      MPV 11.1 fL      Platelets 280 10*3/mm3      Neutrophil % 68.9 %      Lymphocyte % 18.2 (L) %      Monocyte % 10.6 %      Eosinophil % 1.6 %      Basophil % 0.4 %      Immature Grans % 0.3 %      Neutrophils, Absolute 9.71 (H) 10*3/mm3      Lymphocytes, Absolute 2.57 10*3/mm3      Monocytes, Absolute 1.50 (H) 10*3/mm3      Eosinophils, Absolute 0.23 10*3/mm3      Basophils, Absolute 0.06 10*3/mm3      Immature Grans, Absolute 0.04 (H) 10*3/mm3     Urinalysis - Urine, Clean Catch [[id number]]  (Abnormal) Collected:  02/20/18 2019    Specimen:  Urine from Urine, Clean Catch Updated:  02/20/18 2040     Color, UA Dark Yellow (A)     Appearance, UA Clear     pH, UA 6.0     Specific Gravity, UA 1.023     Glucose, UA Negative     Ketones, UA Negative     Bilirubin, UA Negative     Blood, UA Negative     Protein, UA Negative     Leuk Esterase, UA Negative     Nitrite, UA Negative     Urobilinogen, UA 2.0 E.U./dL (A)    Protime-INR [[id number]]  (Abnormal)  Collected:  02/20/18 2023    Specimen:  Blood Updated:  02/20/18 2045     Protime 15.3 (H) Seconds      INR 1.23 (H)    Ammonia [[id number]]  (Normal) Collected:  02/20/18 2023    Specimen:  Blood Updated:  02/20/18 2053     Ammonia 50 umol/L     Lipase [[id number]]  (Abnormal) Collected:  02/20/18 2023    Specimen:  Blood Updated:  02/20/18 2057     Lipase 104 (H) U/L     Comprehensive Metabolic Panel [[id number]]  (Abnormal) Collected:  02/20/18 2023    Specimen:  Blood Updated:  02/20/18 2108     Glucose 93 mg/dL      BUN 16 mg/dL      Creatinine 0.99 mg/dL      Sodium 140 mmol/L      Potassium 4.5 mmol/L      Chloride 103 mmol/L      CO2 28.0 mmol/L      Calcium 9.2 mg/dL      Total Protein 7.7 g/dL      Albumin 3.50 g/dL      ALT (SGPT) 935 (H) U/L      AST (SGOT) 774 (H) U/L      Alkaline Phosphatase 178 (H) U/L      Total Bilirubin 3.6 (H) mg/dL      eGFR Non African Amer 85 mL/min/1.73      Globulin 4.2 gm/dL      A/G Ratio 0.8 g/dL      BUN/Creatinine Ratio 16.2     Anion Gap 9.0 mmol/L     Hepatitis C Genotype [[id number]] Collected:  02/21/18 0318    Specimen:  Blood Updated:  02/21/18 0436    Hepatitis C RNA, Quantitative, PCR (graph) [[id number]] Collected:  02/21/18 0318    Specimen:  Blood Updated:  02/21/18 0436    CBC & Differential [[id number]] Collected:  02/21/18 0318    Specimen:  Blood Updated:  02/21/18 0440    Narrative:       CBC Auto Differential [[id number]]  (Abnormal) Collected:  02/21/18 0318    Specimen:  Blood Updated:  02/21/18 0440     WBC 11.29 (H) 10*3/mm3      RBC 4.55 (L) 10*6/mm3      Hemoglobin 14.6 g/dL      Hematocrit 43.1 %      MCV 94.7 fL      MCH 32.1 pg      MCHC 33.9 g/dL      RDW 18.0 (H) %      RDW-SD 62.6 (H) fl      MPV 12.3 (H) fL      Platelets 259 10*3/mm3      Neutrophil % 65.2 %      Lymphocyte % 21.3 %      Monocyte % 10.3 %      Eosinophil % 2.4 %      Basophil % 0.5 %      Immature Grans % 0.3 %      Neutrophils, Absolute 7.37 10*3/mm3      Lymphocytes, Absolute  2.40 10*3/mm3      Monocytes, Absolute 1.16 10*3/mm3      Eosinophils, Absolute 0.27 10*3/mm3      Basophils, Absolute 0.06 10*3/mm3      Immature Grans, Absolute 0.03 10*3/mm3     Protime-INR [[id number]]  (Abnormal) Collected:  02/21/18 0318    Specimen:  Blood Updated:  02/21/18 0450     Protime 15.8 (H) Seconds      INR 1.29 (H)    Comprehensive Metabolic Panel [[id number]]  (Abnormal) Collected:  02/21/18 0318    Specimen:  Blood Updated:  02/21/18 0515     Glucose 107 (H) mg/dL      BUN 13 mg/dL      Creatinine 0.94 mg/dL      Sodium 139 mmol/L      Potassium 3.6 mmol/L      Chloride 103 mmol/L      CO2 24.0 mmol/L      Calcium 8.3 (L) mg/dL      Total Protein 6.6 g/dL      Albumin 3.00 (L) g/dL      ALT (SGPT) 730 (H) U/L      AST (SGOT) 593 (H) U/L      Alkaline Phosphatase 160 (H) U/L      Total Bilirubin 2.6 (H) mg/dL      eGFR Non African Amer 90 mL/min/1.73      Globulin 3.6 gm/dL      A/G Ratio 0.8 g/dL      BUN/Creatinine Ratio 13.8     Anion Gap 12.0 mmol/L           Imaging Results (all)     Procedure Component Value Units Date/Time    US Liver [[id number]] Collected:  02/21/18 0719     Updated:  02/21/18 0719    Narrative:       LIVER ULTRASOUND     CLINICAL HISTORY: Abnormal liver enzymes, hepatitis.     FINDINGS: Longitudinal and transverse images of the liver obtained.  Liver is homogeneous and normal in echotexture. No mass or biliary  dilation. The adjacent inferior vena cava contains some echogenic  material along its posterior wall concerning for thrombus. This is well  seen on image 24. There clearly is flow adjacent to the echogenic  material suggesting it is only partially occluding. Contrast CT could  better evaluate. Gallbladder partially contracted with wall thickening  measuring up to 4 mm. No shadowing gallstones. Common duct measures 4  mm. Pancreas not well seen. Right kidney without hydronephrosis.       Impression:       1. Unremarkable liver.  2. Echogenic material within the  inferior vena cava concerning for  partially occluding thrombus.  3. Contracted, thick-walled gallbladder without shadowing gallstones.             Orders (all)     Start     Ordered    02/21/18 0934  Inpatient Consult to Vascular Surgery  Once     Specialty:  Vascular Surgery  Provider:  Kj Lam MD    02/21/18 0933    02/21/18 0913  Patient May Shower  Daily      02/21/18 0912    02/21/18 0000  Vital Signs  Every 4 Hours     Comments:  Per per hospital policy    02/20/18 2331    02/21/18 0000  Strict Intake and Output  Every Hour      02/20/18 2331    02/20/18 2332  Full Code  Continuous      02/20/18 2331    02/20/18 2332  Ambulate Patient  Every Shift      02/20/18 2331    02/20/18 2332  Weigh patient  Once      02/20/18 2331    02/20/18 2332  Oxygen Therapy-  Continuous      02/20/18 2331    02/20/18 2332  Place Compression Stockings (TEDs)  Once      02/20/18 2331    02/20/18 2332  Remove & Replace Compression Stockings (TEDS) Daily  Daily      02/20/18 2331    02/20/18 2332  Place Sequential Compression Device  Once      02/20/18 2331    02/20/18 2332  Maintain Sequential Compression Device  Continuous      02/20/18 2331    02/20/18 2332  Diet Regular  Diet Effective Now      02/20/18 2331    02/20/18 2332  Inpatient Consult to Gastroenterology  Once     Specialty:  Gastroenterology  Provider:  Keri Castellanos MD    02/20/18 2331    02/20/18 2306  Inpatient Consult to Nutrition  Once     Provider:  (Not yet assigned)    02/20/18 2305    02/20/18 2300  pneumococcal polysaccharide 23-valent (PNEUMOVAX-23) vaccine 0.5 mL  During Hospitalization      02/20/18 2300    02/20/18 2259  Inpatient Consult to Urology  Once     Specialty:  Urology  Provider:  Manuelito Cain MD    02/20/18 2259 02/20/18 2250  Adult Transthoracic Echo Complete W/ Cont if Necessary Per Protocol  Once      02/20/18 2249    02/20/18 2143  Inpatient Admission  Once      02/20/18 2143 02/20/18 2127  [facility] (on-call MD unless  "specified)  Once     Specialty:  Internal Medicine  Provider:  (Not yet assigned)    18          Operative/Procedure Notes (all)     No notes of this type exist for this encounter.           Physician Progress Notes (all)      Isai Pelletier MD at 2018  9:33 AM  Version 1 of 1           Name: Casimiro Herrera Jr.  ADMIT: 2018   Age/Sex: 37 y.o.male LOS:  LOS: 1 day    :    1980     ROOM: [facility]   MRN:    [id number]    PCP:    No Known Provider     Subjective   Doing ok. No pain. Tolerating po.     Objective   Vital Signs  Temp:  [97.5 °F (36.4 °C)-98.6 °F (37 °C)] 97.5 °F (36.4 °C)  Heart Rate:  [] 60  Resp:  [17-18] 18  BP: (108-126)/(68-79) 114/75  Body mass index is 22.1 kg/(m^2).    Objective:  General Appearance:  Comfortable and well-appearing.    Vital signs: (most recent): Blood pressure 114/75, pulse 60, temperature 97.5 °F (36.4 °C), temperature source Oral, resp. rate 18, height 177.8 cm (70\"), weight 69.9 kg (154 lb), SpO2 97 %.  Vital signs are normal.    HEENT: Normal HEENT exam.    Lungs:  Normal effort.  Breath sounds clear to auscultation.    Heart: Normal rate.  Regular rhythm.    Abdomen: Abdomen is soft and non-distended.  Bowel sounds are normal.   There is no abdominal tenderness.     Extremities: Normal range of motion.  There is no dependent edema.    Neurological: Patient is alert and oriented to person, place and time.    Skin:  Warm and dry.  No rash.             nicotine 1 patch Transdermal Q24H       sodium chloride 100 mL/hr Last Rate: 100 mL/hr (18 0036)   Diet Regular      Assessment/Plan   Assessment:   Principal Problem:    Acute hepatitis B  Active Problems:    Hepatitis C    Dehydration    IV drug abuse    Condyloma acuminata    HPV (human papilloma virus) anogenital infection        Plan:   1. Acute viral Hepatitis 2/2 IV drug abuse  - apparently had positive Hep C antibody and acute hep B at [facility]  - GI checking viral loads as well as " other Hepatitis serologies  - AST/ALT, Alk Phos, TBili all trending down  - ultrasound liver noted  - cont to monitor    2. 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 Condyloma  - urology consulted      Disposition  TBD.      Isai Pelletier MD  [facility]  02/21/18  9:33 AM     Electronically signed by Isai Pelletier MD at 2/21/2018  9:46 AM           Consult Notes (all)      Kaz Koo MD at 2/21/2018  9:06 AM  Version 1 of 1     Consult Orders:    1. Inpatient Consult to Gastroenterology [[id number]] ordered by Stas Blackman MD at 02/20/18 4850                [facility] Gastroenterology [facility]  Initial Inpatient Consult Note    Referring Provider: Dr. J CARLOS Pelletier    Reason for Consultation: hepatitis B and C    Subjective     History of present illness:    37 y.o. male who was admitted 2/20/2018 because of yellow eyes for about a week.  He does have a history of IV drug use but states that he has not had any IV drugs in about 65 days.  He has been fatigued for the last couple of days.  The patient had lab work done that showed his liver function tests were elevated: Yesterday the patient's total bilirubin was 5.1, alkaline phosphatase of 174, ALT of 1117, and AST of 988, his albumin was 4.2.  This morning the patient's total bilirubin is 2.6, ALT is 7:30, AST of 593, alkaline phosphatase of 160, INR 1.29.  The patient's HIV is negative.  His hepatitis A IgM is negative.  Hepatitis B surface antigen is positive.  Hepatitis B core antibody IgM is positive.  His hepatitis C viral antibody is greater than 11.  The patient had an ultrasound of liver last night that showed an unremarkable liver.  There is echogenic material within the inferior vena cava concerning for partially occluding thrombus.  As a contracted thick-walled gallbladder without shadowing gallstones.  The patient has no  abdominal pain or chest pain.  He did have some nausea yesterday but not today.  He has no diarrhea or constipation.    No rectal bleeding or melena.  He's had no alcohol in the last 15 years.  He does smoke about a pack of cigarettes a day.  He has done IV heroin, IV crack, melena.  Has shared needles with his wife although he is  from her diabetes.  He has 2 children and 2 stepchildren.  He is now with his mom.  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  He has no history of jaundice or liver disease.  There is no family history of liver disease although his wife does have hepatitis C and he shared needles with his wife.  His weight is decreased about 80 pounds the last 1 year but his weight has been stable in the last 60 days.  The patient does have a history of Chlamydia and syphilis treated about 15 years ago.  He also has a history of condyloma at the bases.  As for the last 9 years.    Past Medical History:  History reviewed. No pertinent past medical history.  Past Surgical History:  Past Surgical History:   Procedure Laterality Date   • HERNIA REPAIR     • TONSILLECTOMY        Social History:   Social History   Substance Use Topics   • Smoking status: Current Every Day Smoker   • Smokeless tobacco: Never Used   • Alcohol use No      Family History:  History reviewed. No pertinent family history.    Home Meds:  No prescriptions prior to admission.     Current Meds:      Allergies:  No Known Allergies  Review of Systems  The following systems were reviewed and negative;  respiratory, cardiovascular, musculoskeletal and neurological     Objective     Vital Signs  Temp:  [97.5 °F (36.4 °C)-98.6 °F (37 °C)] 97.5 °F (36.4 °C)  Heart Rate:  [] 60  Resp:  [17-18] 18  BP: (108-126)/(68-79) 114/75  Physical Exam:  General Appearance:    Alert, cooperative, in no acute distress   Head:    Normocephalic, without obvious abnormality, atraumatic   Eyes:            Lids and lashes normal, conjunctivae and  sclerae normal, +   icterus   Throat:   No oral lesions, no thrush, oral mucosa moist   Neck:   No adenopathy, supple, trachea midline, no thyromegaly, no   carotid bruit, no JVD   Lungs:     Clear to auscultation,respirations regular, even and                   unlabored    Heart:    Regular rhythm and normal rate, normal S1 and S2, no            murmur, no gallop, no rub, no click   Chest Wall:    No abnormalities observed   Abdomen:     Normal bowel sounds, no masses, no organomegaly, soft        non-tender, non-distended, no guarding, no rebound                 tenderness   Rectal:     Deferred   Extremities:   no edema, no cyanosis, no redness   Skin:   No bleeding, bruising or rash. He has a number of tattoos on his chest, neck, back, arms and hands.   Lymph nodes:   No palpable adenopathy   Psychiatric:  Judgement and insight: normal   Orientation to person place and time: normal   Mood and affect: normal    Assessment/Plan   Patient Active Problem List   Diagnosis   • Acute hepatitis B   • Hepatitis C   • Dehydration   • IV drug abuse   • Condyloma acuminata   • HPV (human papilloma virus) anogenital infection       I discussed the patients findings and my recommendations with patient.    Impression:  1.  This 37-year-old gentleman has a history of IV drug abuse although he is HIV now.  He's had no IV drugs in 65 days he says.  2.  He has a history of jaundice for the last week that could very well be from acute hepatitis B.  He also appears to have hepatitis C antibody which tells me this is probably chronic hepatitis C.  3.  He has an abnormal ultrasound of the liver showing echogenic material in the inferior vena cava possibly from partially occluding thrombus?  4.  He has a history of Chlamydia and syphilis treated 15 years ago.  5.  He has condyloma at the base of his penis for the last 9 years.    Recommendations:  1.  I await the results of the hepatitis B viral DNA load. We will not treat acute  hepatitis B and hope that he gets over this and does not develop chronic hep B? We may not know this for 6ish months?  2.  Await the results of the hepatitis C viral load and genotype.  3.  I will check a hepatitis B E antigen and E antibody.  4.  I will check for hepatitis D.  5.  I would consider getting a vascular surgery opinion about the echogenic material in the inferior vena cava that may represent a partially occluding thrombus in the IVC?    Kaz Koo MD   Electronically signed by Kaz Koo MD at 2/21/2018  9:17 AM          All medication doses during the admission are shown, including meds that are no longer on order.     Scheduled Meds Sorted by Name for Casimiro Herrera Jr. as of 2/21/18 through 2/21/18       1 Day 3 Days 7 Days 10 Days < Today >    Legend:                          Inactive     Active     Other Encounter    Linked               Medications 02/21/18   nicotine (NICODERM CQ) 14 MG/24HR patch 1 patch  Dose: 1 patch Freq: Every 24 Hours Route: TD  Start: 02/21/18 0945    Admin Instructions:   Apply to clean, dry, nonhairy area of skin (typically upper arm or shoulder)      0945                            sodium chloride 0.9 % bolus 1,000 mL  Dose: 1,000 mL Freq: Once Route: IV  Last Dose: Stopped (02/20/18 2207)  Start: 02/20/18 1945 End: 02/20/18 2207             Continuous Meds Sorted by Name for Casimiro Herrera Jr. as of 2/21/18 through 2/21/18      Legend:                          Inactive     Active     Other Encounter    Linked               Medications 02/21/18   sodium chloride 0.9 % infusion  Rate: 100 mL/hr Freq: Continuous Route: IV  Last Dose: 100 mL/hr (02/21/18 0036)  Start: 02/21/18 0015    0036                                  PRN Meds Sorted by Name for Casimiro Herrera Jr. as of 2/21/18 through 2/21/18      Legend:                          Inactive     Active     Other Encounter    Linked               Medications 02/21/18   pneumococcal polysaccharide 23-valent  (PNEUMOVAX-23) vaccine 0.5 mL  Dose: 0.5 mL Freq: During Hospitalization Route: IM  PRN Reason: Immunization  Start: 02/20/18 2300    Admin Instructions:   **Do Not Administer if Temperature Greater Than 102F & Notify Pharmacy**   Pneumococcal & Influenza Vaccines May Be Given At The Same Time in SEPARATE Injections         sodium chloride 0.9 % flush 1-10 mL  Dose: 1-10 mL Freq: As Needed Route: IV  PRN Reason: Line Care  Start: 02/20/18 2331       sodium chloride 0.9 % flush 10 mL  Dose: 10 mL Freq: As Needed Route: IV  PRN Reason: Line Care  Start: 02/20/18 7114

## 2018-02-21 NOTE — PROGRESS NOTES
Discharge Planning Assessment  [facility]     Patient Name: Casimiro Herrera Jr.  MRN: [id number]  Today's Date: 2/21/2018    Admit Date: 2/20/2018          Discharge Needs Assessment       02/21/18 1020    Living Environment    Lives With parent(s);child(freddy), dependent    Living Arrangements apartment    Living Environment    Quality Of Family Relationships supportive    Able to Return to Prior Living Arrangements yes    Discharge Needs Assessment    Concerns To Be Addressed denies needs/concerns at this time    Anticipated Changes Related to Illness none    Equipment Currently Used at Home none            Discharge Plan       02/21/18 1021    Case Management/Social Work Plan    Plan Plans are home    Patient/Family In Agreement With Plan yes    Additional Comments CCP spoke with pt @ bedside to discuss dc planning.  Confirmed face sheet and pharmacy info with pt.  Pt states he lives in 1st floor apartment with mom, dad, and his daughter.  Pt is [employer] and has no DME.  Pt does not have a primary care MD, states he goes to [facility] medical needs.  Pt states he had an appointment with PMD recently, but missed appointment and has not rescheduled.  States he will need to find a new MD.  Instructed that CCP will follow and assist as needed.        Discharge Placement     No information found                Demographic Summary     None            Functional Status       02/21/18 1020    Functional Status Prior    Ambulation 0-->independent    Transferring 0-->independent    Toileting 0-->independent    Bathing 0-->independent    Dressing 0-->independent    Eating 0-->independent    IADL    Medications independent    Meal Preparation independent    Housekeeping independent    Laundry independent    Shopping independent    Oral Care independent    Cognitive/Perceptual/Developmental    Current Mental Status/Cognitive Functioning no deficits noted            Psychosocial     None            Abuse/Neglect      None            Legal       02/21/18 1020    Legal    Legal Comments no POA/ HCS            Substance Abuse     None            Patient Forms     None          Susi Hale RN

## 2018-02-21 NOTE — CONSULTS
Name: Casimiro Herrera Jr. ADMIT: 2018   : 1980  PCP: No Known Provider    MRN: [id number] LOS: 1 days   AGE/SEX: 37 y.o. male  ROOM: 506/1     Inpatient Consult to Vascular Surgery  Consult performed by: YADIRA JARRETT  Consult ordered by: KATY ROY MD     LOS: 1 day   Patient Care Team:  No Known Provider as PCP - General    Subjective     History of Present Illness  37 y.o. male asked to see for abnormality seen of inferior vena cava on ultrasound liver.  The inferior vena cava is patent however there was consideration for stenosis and/or thrombosis of the vena cava adjacent to the liver.  Patient was admitted for hiatus.  He has been worked up to evaluate etiology of this.  He does have a history of intravenous drug abuse but states has stopped 63 days ago.  Patient gives no history of lower extremity deep vein thrombosis or family history of the deep vein thrombosis.  He has no leg swelling and no pain of his legs.  He does complain of occasional back pain.  He does have generalized fatigue, nausea, and jaundice for which he was admitted.     He has no other medical problems.  He has history of condyloma of the penis.  He has had bilateral hernia surgeries as a child and tonsillectomy as his only surgeries.  He took no medications prior to admission.  He has normal allergies.    He worked as a .  He has not worked recently.    He is a smoker and uses marijuana medicinally according to patient.  He does use ethanol ethanol but on a regular basis.      Review of Systems   Constitutional: Positive for fatigue.   Gastrointestinal: Positive for nausea.   Genitourinary: Positive for penile pain.   Skin: Positive for color change.   All other systems reviewed and are negative.      History reviewed. No pertinent past medical history.    Past Surgical History:   Procedure Laterality Date   • HERNIA REPAIR     • TONSILLECTOMY         History  reviewed. No pertinent family history.    Social History   Substance Use Topics   • Smoking status: Current Every Day Smoker   • Smokeless tobacco: Never Used   • Alcohol use No       Allergies: Review of patient's allergies indicates no known allergies.    No prescriptions prior to admission.       nicotine 1 patch Transdermal Q24H       sodium chloride 100 mL/hr Last Rate: 100 mL/hr (02/21/18 0036)     pneumococcal polysaccharide 23-valent  •  sodium chloride  •  Insert peripheral IV **AND** sodium chloride      Objective     Physical Exam   Constitutional: He is oriented to person, place, and time. He appears well-developed and well-nourished.   HENT:   Head: Normocephalic and atraumatic.   Nose: Nose normal.   Eyes: EOM are normal. Pupils are equal, round, and reactive to light. Scleral icterus is present.   Neck: Normal range of motion. Neck supple.   Cardiovascular: Normal rate, normal heart sounds and intact distal pulses.    Pulmonary/Chest: Effort normal and breath sounds normal.   Abdominal: Soft. Bowel sounds are normal.   Musculoskeletal: Normal range of motion.   Neurological: He is alert and oriented to person, place, and time.   Skin: Skin is warm and dry.   Psychiatric: He has a normal mood and affect. His behavior is normal. Judgment and thought content normal.   Vitals reviewed.        Results from last 7 days  Lab Units 02/21/18 0318 02/20/18 2024 02/19/18  1841   WBC 10*3/mm3 11.29* 14.11* 11.7*   HEMOGLOBIN g/dL 14.6 15.7 16.7   PLATELETS 10*3/mm3 259 280 303     Results from last 7 days  Lab Units 02/21/18 0318 02/20/18 2023 02/19/18  1841   SODIUM mmol/L 139 140 136   POTASSIUM mmol/L 3.6 4.5 4.5   CHLORIDE mmol/L 103 103 95*   CO2 mmol/L 24.0 28.0  --    TOTAL CO2, ARTERIAL mmol/L  --   --  22   BUN mg/dL 13 16 23*   CREATININE mg/dL 0.94 0.99 1.23   GLUCOSE mg/dL 107* 93  --    Estimated Creatinine Clearance: 105.3 mL/min (by C-G formula based on Cr of 0.94).  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     Problem Points:  4:  Patient has a new problem, with additional work-up planned  Total problem points:4 or more    Data Points:  1:  I have reviewed or order clinical lab test  1:  I have reviewed or order radiology test (except heart catheterization or echo)  2:  I have reviewed and summation of old records and/or discussed the patients care with another health care provider  Total data points:4 or more    Risk Points:  High:  Cardiovascular imaging with risk factors    MDM Prob point Data point Risk   SF 1 1 Minimal   Low 2 2 Low   Mod 3 3 Moderate   High 4 4 High     Code MDM History Exam Time   55276 SF/Low Detailed Detailed 30   07026 Mod Comprehensive Comprehensive 50   21220 High Comprehensive Comprehensive 70     Detailed history:  4 elements HPI or status of 3 chronic problems; 2-9 system ROS  Comprehensive:  4 elements HPI or status of 3 chronic problems;  10 system ROS    Detailed Exam:  12 findings from any organ system  Comprehensive Exam:  2 findings from each of 9 systems.     [place]    Assessment/Plan     Principal Problem:    Acute hepatitis B  Active Problems:    Hepatitis C    Dehydration    IV drug abuse    Condyloma acuminata    HPV (human papilloma virus) anogenital infection    Inferior vena caval stenosis        37 y.o. male with intravenous drug use and  acute hepatitis and jaundice with fatigue and nausea.  Ultrasound liver possibly showed vena cava stenosis or thrombosis adjacent to the liver but vena cava was patent.  We have been asked to see for possible vena cava thrombosis.  We'll check CT angiogram of the abdomen with venous phase.  Also check lower extremity venous Dopplers bilaterally for deep vein thrombosis.  Further recommendations to follow pending upon results.    I discussed the patients findings and my recommendations with patient.  Please call my office with any question: [phone]    Ricardo Lewis MD  02/21/18  10:56 AM

## 2018-02-21 NOTE — PLAN OF CARE
Problem: Nutrition, Imbalanced: Inadequate Oral Intake (Adult)  Intervention: Promote/Optimize Nutrition   02/21/18 0817   Nutrition Interventions   Oral Nutrition Promotion calorie dense liquids provided;nutritional therapy counseling provided;calorie dense foods provided

## 2018-02-21 NOTE — ED PROVIDER NOTES
"EMERGENCY DEPARTMENT ENCOUNTER    Room Number:  40/40  Date seen:  2/20/2018  Time seen: 7:20 PM  PCP: No Known Provider   History provided by- patient, family    HPI:  Chief complaint: generalized weakness  Context:Casimiro Herrera Jr. is a 37 y.o. male who states that he has past hx of IV drug use (heroin, methamphetamine, cocaine) and has practiced sobriety for about 64 days. He presents to the ED with generalized weakness that started a few weeks ago. He has also had fatigue, nausea, dark urine, intermittent blurred vision, jaundiced skin, and intermittent lightheadedness. He denies vomiting, diarrhea, abd pain, pain and difficulty with urination, chest pain, trouble breathing, BLE swelling, headache, focal weakness, numbness, fevers, and chills. He reports that he was seen [facility], underwent hepatitis panel that was positive for hepatitis B and C, HIV panel that was negative, and had elevated LFTs; consequently, he was referred to ED for further evaluation. Per family, pt has appointment with hepatologist on 3/8/18 and is currently considering undergoing rehab. Pt has no other complaints at this time.     Timing: constant  Duration: started a few weeks ago  Location: generalized  Radiation: none  Quality: \"weak all over\"  Intensity/Severity: moderate   Associated Symptoms: fatigue, nausea, dark urine, intermittent blurred vision, jaundiced skin, intermittent lightheadedness   Aggravating Factors: activity  Alleviating Factors: rest  Previous Episodes: none  Treatment before arrival: Pt reports that he was seen at [facility] yesterday, underwent hepatitis panel that was positive for hepatitis B and C, HIV panel that was negative, and had elevated LFTs; consequently, he was referred to ED for further evaluation.    MEDICAL RECORD REVIEW  Pt was seen yesterday at [facility] for confusion, fatigue, and jaundiced skin. At the time, AST was 988, ALT was 1117, total bili was 5.1, alk phos was 174, HIV panel was nonreactive, " and hepatitis panel was positive for hepatitis B and C.     ALLERGIES  Review of patient's allergies indicates no known allergies.    PAST MEDICAL HISTORY  Active Ambulatory Problems     Diagnosis Date Noted   • No Active Ambulatory Problems     Resolved Ambulatory Problems     Diagnosis Date Noted   • No Resolved Ambulatory Problems     No Additional Past Medical History       PAST SURGICAL HISTORY  Past Surgical History:   Procedure Laterality Date   • HERNIA REPAIR     • TONSILLECTOMY         FAMILY HISTORY  History reviewed. No pertinent family history.    SOCIAL HISTORY  Social History     Social History   • Marital status:      Spouse name: N/A   • Number of children: N/A   • Years of education: N/A     Occupational History   • Not on file.     Social History Main Topics   • Smoking status: Current Every Day Smoker   • Smokeless tobacco: Never Used   • Alcohol use No   • Drug use: Not on file   • Sexual activity: Not on file     Other Topics Concern   • Not on file     Social History Narrative       REVIEW OF SYSTEMS  Review of Systems   Constitutional: Positive for fatigue. Negative for chills and fever.   HENT: Negative for congestion, rhinorrhea and sore throat.    Eyes: Positive for visual disturbance (intermittent blurred vision ). Negative for pain.   Respiratory: Negative for cough and shortness of breath.    Cardiovascular: Negative for chest pain and palpitations.   Gastrointestinal: Positive for nausea. Negative for abdominal pain, diarrhea and vomiting.   Endocrine: Negative.    Genitourinary: Negative for difficulty urinating and dysuria.        Dark urine   Musculoskeletal: Negative for myalgias.   Skin: Positive for color change (jaundiced skin).   Neurological: Positive for dizziness (intermittent lightheadedness), weakness (generalized weakness, no focal weakness) and light-headedness (intermittent). Negative for numbness and headaches.   Psychiatric/Behavioral: Negative.        PHYSICAL  EXAM  ED Triage Vitals   Temp Heart Rate Resp BP SpO2   02/20/18 1812 02/20/18 1812 02/20/18 1812 02/20/18 1829 02/20/18 1812   97.8 °F (36.6 °C) 108 18 122/79 98 % WNL      Temp src Heart Rate Source Patient Position BP Location FiO2 (%)   02/20/18 1812 02/20/18 1812 02/20/18 1829 02/20/18 1829 --   Tympanic Monitor Sitting Left arm      Physical Exam   Constitutional: He is oriented to person, place, and time. He appears jaundiced (slight). No distress.   HENT:   Head: Normocephalic and atraumatic.   Eyes: EOM are normal. Pupils are equal, round, and reactive to light. Scleral icterus is present.   Neck: Normal range of motion. Neck supple.   Cardiovascular: Normal rate, regular rhythm, S1 normal, S2 normal and normal heart sounds.  Exam reveals no gallop and no friction rub.    No murmur heard.  Pulmonary/Chest: Effort normal. No respiratory distress. He has no wheezes. He has no rales. He exhibits no tenderness.   Abdominal: Soft. There is no tenderness. There is no rebound and no guarding.   No RUQ tenderness   Genitourinary:   Genitourinary Comments: Male tech at pt's bedside during male  exam.  There is a large condyloma which originates at shaft of penis, this is larger on right side of base of penis than left.  At the base on right side there is some excoriated area with moist, reddened skin and odor noted.  Pt states this condyloma has been present for 9 years.  He denies any condylomas around his rectum; rectal exam deferred.    Musculoskeletal: Normal range of motion. He exhibits no edema (no BLE edema) or deformity.   Lymphadenopathy:     He has no cervical adenopathy.   Neurological: He is alert and oriented to person, place, and time.   Skin: Skin is warm, dry and intact.   Slightly jaundiced   Psychiatric: Affect normal.   Nursing note and vitals reviewed.      LAB RESULTS  Recent Results (from the past 24 hour(s))   Urinalysis - Urine, Clean Catch    Collection Time: 02/20/18  8:19 PM   Result  Value Ref Range    Color, UA Dark Yellow (A) Yellow, Straw    Appearance, UA Clear Clear    pH, UA 6.0 5.0 - 8.0    Specific Gravity, UA 1.023 1.005 - 1.030    Glucose, UA Negative Negative    Ketones, UA Negative Negative    Bilirubin, UA Negative Negative    Blood, UA Negative Negative    Protein, UA Negative Negative    Leuk Esterase, UA Negative Negative    Nitrite, UA Negative Negative    Urobilinogen, UA 2.0 E.U./dL (A) 0.2 - 1.0 E.U./dL   Comprehensive Metabolic Panel    Collection Time: 02/20/18  8:23 PM   Result Value Ref Range    Glucose 93 65 - 99 mg/dL    BUN 16 6 - 20 mg/dL    Creatinine 0.99 0.76 - 1.27 mg/dL    Sodium 140 136 - 145 mmol/L    Potassium 4.5 3.5 - 5.2 mmol/L    Chloride 103 98 - 107 mmol/L    CO2 28.0 22.0 - 29.0 mmol/L    Calcium 9.2 8.6 - 10.5 mg/dL    Total Protein 7.7 6.0 - 8.5 g/dL    Albumin 3.50 3.50 - 5.20 g/dL    ALT (SGPT) 935 (H) 1 - 41 U/L    AST (SGOT) 774 (H) 1 - 40 U/L    Alkaline Phosphatase 178 (H) 39 - 117 U/L    Total Bilirubin 3.6 (H) 0.1 - 1.2 mg/dL    eGFR Non African Amer 85 >60 mL/min/1.73    Globulin 4.2 gm/dL    A/G Ratio 0.8 g/dL    BUN/Creatinine Ratio 16.2 7.0 - 25.0    Anion Gap 9.0 mmol/L   Protime-INR    Collection Time: 02/20/18  8:23 PM   Result Value Ref Range    Protime 15.3 (H) 11.7 - 14.2 Seconds    INR 1.23 (H) 0.90 - 1.10   Lipase    Collection Time: 02/20/18  8:23 PM   Result Value Ref Range    Lipase 104 (H) 13 - 60 U/L   Ammonia    Collection Time: 02/20/18  8:23 PM   Result Value Ref Range    Ammonia 50 16 - 60 umol/L   CBC Auto Differential    Collection Time: 02/20/18  8:24 PM   Result Value Ref Range    WBC 14.11 (H) 4.50 - 10.70 10*3/mm3    RBC 4.99 4.60 - 6.00 10*6/mm3    Hemoglobin 15.7 13.7 - 17.6 g/dL    Hematocrit 46.3 40.4 - 52.2 %    MCV 92.8 79.8 - 96.2 fL    MCH 31.5 27.0 - 32.7 pg    MCHC 33.9 32.6 - 36.4 g/dL    RDW 17.1 (H) 11.5 - 14.5 %    RDW-SD 58.0 (H) 37.0 - 54.0 fl    MPV 11.1 6.0 - 12.0 fL    Platelets 280 140 - 500  "10*3/mm3    Neutrophil % 68.9 42.7 - 76.0 %    Lymphocyte % 18.2 (L) 19.6 - 45.3 %    Monocyte % 10.6 5.0 - 12.0 %    Eosinophil % 1.6 0.3 - 6.2 %    Basophil % 0.4 0.0 - 1.5 %    Immature Grans % 0.3 0.0 - 0.5 %    Neutrophils, Absolute 9.71 (H) 1.90 - 8.10 10*3/mm3    Lymphocytes, Absolute 2.57 0.90 - 4.80 10*3/mm3    Monocytes, Absolute 1.50 (H) 0.20 - 1.20 10*3/mm3    Eosinophils, Absolute 0.23 0.00 - 0.70 10*3/mm3    Basophils, Absolute 0.06 0.00 - 0.20 10*3/mm3    Immature Grans, Absolute 0.04 (H) 0.00 - 0.03 10*3/mm3       I ordered the above labs and reviewed the results      MEDICATIONS GIVEN IN ER  Medications   sodium chloride 0.9 % flush 10 mL (not administered)   sodium chloride 0.9 % bolus 1,000 mL (1,000 mL Intravenous New Bag 2/20/18 2030)         PROCEDURES  Procedures    COURSE & MEDICAL DECISION MAKING  Pertinent Labs that were ordered and reviewed are noted above.  Results were reviewed/discussed with the patient and they were also made aware of online assess.  Pt also made aware that some labs, such as cultures, will not be resulted during ER visit and follow up with PMD is necessary.     PROGRESS AND CONSULTS    Progress Notes:    ED Course     1934- Pt now states that he has \"an STD (HPV)\" and has been having recent drainage and odor from chronic penile lesion (present for about 9 years). He requests  exam. Performed male  exam with male tech at pt's bedside. See physical exam for details.      1943- Ordered IV fluids for hydration. Ordered ammonia level, blood work, UA, lipase, PT with INR, and RPR for further evaluation.     2126- Sent call out to [facility] for admission.     2139- Discussed case with Dr Blackman ([facility] hospitalist)  Reviewed history, exam, results and treatments.  Discussed concerns and plan of care. Dr Blackman accepts pt to be admitted to telemetry.    2143- Rechecked pt. He is resting comfortably and is in no acute distress. Discussed with pt and family about all pertinent " "results including mildly elevated WBC count, elevated LFTs, unremarkable UA, and normal ammonia level. Discussed pt admission for further care and observation. Pt and family verbalize understanding and agreement with plan.     2145- Reviewed pt's history and workup with Dr. Byrd.  After a bedside evaluation; Dr Byrd agrees with the plan of care.     Based on the patient's lab findings and presenting symptoms, the doctor and I feel it is appropriate to admit the patient for further management, evaluation, and treatment.  I have discussed this with the admitting team.  I have also discussed this with the patient/family.  They are in agreement with admission.        Disposition vitals:  /78  Pulse 78  Temp 97.8 °F (36.6 °C) (Tympanic)   Resp 18  Ht 177.8 cm (70\")  Wt 69.9 kg (154 lb)  SpO2 97%  BMI 22.1 kg/m2      DIAGNOSIS  Final diagnoses:   Acute hepatitis B   Condyloma         Documentation assistance provided by bhavesh Morales for SHANE Allan.  Information recorded by the bhavesh was done at my direction and has been verified and validated by me.  Electronically signed by Karrie Morales on 2/20/2018 at time 9:44 PM     Karrie Morales  02/20/18 2200       SHANE Pressley  02/21/18 0006    "

## 2018-02-21 NOTE — ED PROVIDER NOTES
Pt presents to the ED c/o generalized weakness for the past couple of weeks with associated jaundicing of the eyes and intermittent dizziness. He has a hx of IV drug use (64 days sober). He admits he has been shared needles with his wife in the past that has confirmed hepatitis C. He denies hx of hepatitis. On exam, pt is awake and alert in NAD, abd soft non-tender, non distended, RRR, CTAB, mild scleral icterus, mild jaundice.     Pt counseled on the limitations of treatment for hepatitis C, but made aware of the typical treatment plan for it.    Attestation:  I supervised care provided by the midlevel provider.    We have discussed this patient's history, physical exam, and treatment plan.   I have reviewed the note and personally saw and examined the patient and agree with the plan of care.    Documentation assistance provided by bhavesh Aldrich for Dr. Byrd Information recorded by the scribarnaud was done at my direction and has been verified and validated by me.       Mima Aldrich  02/20/18 3206       Hugo Byrd MD  02/21/18 3538

## 2018-02-21 NOTE — CONSULTS
[facility]  Initial Inpatient Consult Note    Referring Provider: Dr. J CARLOS Pelletier    Reason for Consultation: hepatitis B and C    Subjective     History of present illness:    37 y.o. male who was admitted 2/20/2018 because of yellow eyes for about a week.  He does have a history of IV drug use but states that he has not had any IV drugs in about 65 days.  He has been fatigued for the last couple of days.  The patient had lab work done that showed his liver function tests were elevated: Yesterday the patient's total bilirubin was 5.1, alkaline phosphatase of 174, ALT of 1117, and AST of 988, his albumin was 4.2.  This morning the patient's total bilirubin is 2.6, ALT is 7:30, AST of 593, alkaline phosphatase of 160, INR 1.29.  The patient's HIV is negative.  His hepatitis A IgM is negative.  Hepatitis B surface antigen is positive.  Hepatitis B core antibody IgM is positive.  His hepatitis C viral antibody is greater than 11.  The patient had an ultrasound of liver last night that showed an unremarkable liver.  There is echogenic material within the inferior vena cava concerning for partially occluding thrombus.  As a contracted thick-walled gallbladder without shadowing gallstones.  The patient has no abdominal pain or chest pain.  He did have some nausea yesterday but not today.  He has no diarrhea or constipation.    No rectal bleeding or melena.  He's had no alcohol in the last 15 years.  He does smoke about a pack of cigarettes a day.  He has done IV heroin, IV crack, melena.  Has shared needles with his wife although he is  from her diabetes.  He has 2 children and 2 stepchildren.  He is now with his mom.  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  He has no history of jaundice or liver disease.  There is no family history of liver disease although his wife does have hepatitis C and he shared needles with his wife.  His weight is decreased about 80 pounds the last 1 year but  his weight has been stable in the last 60 days.  The patient does have a history of Chlamydia and syphilis treated about 15 years ago.  He also has a history of condyloma at the bases.  As for the last 9 years.    Past Medical History:  History reviewed. No pertinent past medical history.  Past Surgical History:  Past Surgical History:   Procedure Laterality Date   • HERNIA REPAIR     • TONSILLECTOMY        Social History:   Social History   Substance Use Topics   • Smoking status: Current Every Day Smoker   • Smokeless tobacco: Never Used   • Alcohol use No      Family History:  History reviewed. No pertinent family history.    Home Meds:  No prescriptions prior to admission.     Current Meds:      Allergies:  No Known Allergies  Review of Systems  The following systems were reviewed and negative;  respiratory, cardiovascular, musculoskeletal and neurological     Objective     Vital Signs  Temp:  [97.5 °F (36.4 °C)-98.6 °F (37 °C)] 97.5 °F (36.4 °C)  Heart Rate:  [] 60  Resp:  [17-18] 18  BP: (108-126)/(68-79) 114/75  Physical Exam:  General Appearance:    Alert, cooperative, in no acute distress   Head:    Normocephalic, without obvious abnormality, atraumatic   Eyes:            Lids and lashes normal, conjunctivae and sclerae normal, +   icterus   Throat:   No oral lesions, no thrush, oral mucosa moist   Neck:   No adenopathy, supple, trachea midline, no thyromegaly, no   carotid bruit, no JVD   Lungs:     Clear to auscultation,respirations regular, even and                   unlabored    Heart:    Regular rhythm and normal rate, normal S1 and S2, no            murmur, no gallop, no rub, no click   Chest Wall:    No abnormalities observed   Abdomen:     Normal bowel sounds, no masses, no organomegaly, soft        non-tender, non-distended, no guarding, no rebound                 tenderness   Rectal:     Deferred   Extremities:   no edema, no cyanosis, no redness   Skin:   No bleeding, bruising or rash. He  has a number of tattoos on his chest, neck, back, arms and hands.   Lymph nodes:   No palpable adenopathy   Psychiatric:  Judgement and insight: normal   Orientation to person place and time: normal   Mood and affect: normal   Results Review:   I reviewed the patient's new clinical results.      Results from last 7 days  Lab Units 02/21/18 0318 02/20/18 2024 02/19/18 1841   WBC 10*3/mm3 11.29* 14.11* 11.7*   HEMOGLOBIN g/dL 14.6 15.7 16.7   HEMATOCRIT % 43.1 46.3 49.0   PLATELETS 10*3/mm3 259 280 303       Results from last 7 days  Lab Units 02/21/18 0318 02/20/18 2023 02/19/18  1841   SODIUM mmol/L 139 140 136   POTASSIUM mmol/L 3.6 4.5 4.5   CHLORIDE mmol/L 103 103 95*   CO2 mmol/L 24.0 28.0  --    TOTAL CO2, ARTERIAL mmol/L  --   --  22   BUN mg/dL 13 16 23*   CREATININE mg/dL 0.94 0.99 1.23   CALCIUM mg/dL 8.3* 9.2 9.5   BILIRUBIN mg/dL 2.6* 3.6* 5.1*   ALK PHOS U/L 160* 178* 174*   ALT (SGPT) U/L 730* 935* 1117*   AST (SGOT) U/L 593* 774* 988*   GLUCOSE mg/dL 107* 93  --        Results from last 7 days  Lab Units 02/21/18 0318 02/20/18 2023   INR  1.29* 1.23*       Lab Results  Lab Value Date/Time   LIPASE 104 (H) 02/20/2018 2023       Radiology:  US Liver   Preliminary Result   1. Unremarkable liver.   2. Echogenic material within the inferior vena cava concerning for   partially occluding thrombus.   3. Contracted, thick-walled gallbladder without shadowing gallstones.              Assessment/Plan   Patient Active Problem List   Diagnosis   • Acute hepatitis B   • Hepatitis C   • Dehydration   • IV drug abuse   • Condyloma acuminata   • HPV (human papilloma virus) anogenital infection       I discussed the patients findings and my recommendations with patient.    Impression:  1.  This 37-year-old gentleman has a history of IV drug abuse although he is HIV now.  He's had no IV drugs in 65 days he says.  2.  He has a history of jaundice for the last week that could very well be from acute hepatitis B.   He also appears to have hepatitis C antibody which tells me this is probably chronic hepatitis C.  3.  He has an abnormal ultrasound of the liver showing echogenic material in the inferior vena cava possibly from partially occluding thrombus?  4.  He has a history of Chlamydia and syphilis treated 15 years ago.  5.  He has condyloma at the base of his penis for the last 9 years.    Recommendations:  1.  I await the results of the hepatitis B viral DNA load. We will not treat acute hepatitis B and hope that he gets over this and does not develop chronic hep B? We may not know this for 6ish months?  2.  Await the results of the hepatitis C viral load and genotype.  3.  I will check a hepatitis B E antigen and E antibody.  4.  I will check for hepatitis D.  5.  I would consider getting a vascular surgery opinion about the echogenic material in the inferior vena cava that may represent a partially occluding thrombus in the IVC?    Kaz Koo MD

## 2018-02-21 NOTE — PLAN OF CARE
Problem: Patient Care Overview (Adult)  Goal: Plan of Care Review  Outcome: Ongoing (interventions implemented as appropriate)   02/21/18 0508   Coping/Psychosocial Response Interventions   Plan Of Care Reviewed With patient   Patient Care Overview   Progress progress toward functional goals as expected   Outcome Evaluation   Outcome Summary/Follow up Plan A&O x4. Scant yellowing of eyes noted. Ambulates hallways and up to restroom per self. No major weaknesses noted. Liver US performed approx 4am- waiting results. Echo yet to be done. NS running at 100 ml. Tends to be awake at night but got some sleep.     Goal: Discharge Needs Assessment  Outcome: Ongoing (interventions implemented as appropriate)      Problem: Fall Risk (Adult)  Goal: Identify Related Risk Factors and Signs and Symptoms  Outcome: Outcome(s) achieved Date Met: 02/21/18    Goal: Absence of Falls  Outcome: Ongoing (interventions implemented as appropriate)      Problem: Pain, Acute (Adult)  Goal: Identify Related Risk Factors and Signs and Symptoms  Outcome: Outcome(s) achieved Date Met: 02/21/18    Goal: Acceptable Pain Control/Comfort Level  Outcome: Ongoing (interventions implemented as appropriate)

## 2018-02-21 NOTE — H&P
[facility] Admission H&P    Patient Care Team:  No Known Provider as PCP - General    Chief complaint: Fatigue, jaundice, dark urine    History of Present Illness    This is a 37-year-old male who has a history of IV drug use and states that he has been clean for 63 days.  He has noticed increasing fatigue, jaundice with yellowing of his eyes, and dark urine worsening over the past 5 days.  He went to an urgent care clinic yesterday and had lab work performed including liver function panel which showed markedly elevated LFTs, and acute hepatitis panel which was positive for acute hepatitis B and C.  He states they instructed him to go to the emergency room for further evaluation which is the reason for his presentation this evening.  He has been having some nausea over the past 24 hours but no vomiting.  He denies abdominal pain or diarrhea.  He denies fevers or chills.  No shortness of breath, cough, chest pain or palpitations.  He states he does have a history of HPV with worsening condyloma at the base of his penis which has been present for about 9 years.  He states it has gotten markedly worse over the past week.  He denies any urinary symptoms.  He states that about 15 years ago he had chlamydia and syphilis and was treated for both of those.    History reviewed. No pertinent past medical history.  Past Surgical History:   Procedure Laterality Date   • HERNIA REPAIR     • TONSILLECTOMY       History reviewed. No pertinent family history.  Social History   Substance Use Topics   • Smoking status: Current Every Day Smoker   • Smokeless tobacco: Never Used   • Alcohol use No     No prescriptions prior to admission.      Allergies:  Review of patient's allergies indicates no known allergies.    Review of Systems   Constitutional: Positive for fatigue. Negative for chills and fever.   HENT: Negative for congestion and sore throat.    Eyes: Negative for visual disturbance.   Respiratory: Negative for cough, chest  tightness, shortness of breath and wheezing.    Cardiovascular: Negative for chest pain, palpitations and leg swelling.   Gastrointestinal: Positive for nausea. Negative for abdominal distention, abdominal pain, diarrhea and vomiting.   Endocrine: Negative for polydipsia and polyuria.   Genitourinary: Positive for decreased urine volume and penile pain. Negative for difficulty urinating, dysuria, frequency and urgency.        Worsening condyloma around the base of his penis   Musculoskeletal: Negative for arthralgias and myalgias.   Skin: Negative for color change and rash.   Neurological: Negative for dizziness and light-headedness.        PHYSICAL EXAM    Vital Signs  tMax 24 hrs:  Temp (24hrs), Av °F (36.7 °C), Min:97.8 °F (36.6 °C), Max:98.2 °F (36.8 °C)    Vitals Ranges:  Temp:  [97.8 °F (36.6 °C)-98.2 °F (36.8 °C)] 98.2 °F (36.8 °C)  Heart Rate:  [] 74  Resp:  [17-18] 17  BP: (110-126)/(74-79) 126/77    Physical Exam   Constitutional: He is oriented to person, place, and time. He appears well-developed and well-nourished. No distress.   HENT:   Head: Normocephalic and atraumatic.   Extremely poor dentition   Eyes: EOM are normal. Pupils are equal, round, and reactive to light. Scleral icterus is present.   Neck: Neck supple. No tracheal deviation present.   Cardiovascular: Normal rate and regular rhythm.  Exam reveals gallop.    No murmur heard.  Pulmonary/Chest: Effort normal. No respiratory distress. He has no wheezes.   Abdominal: Soft. Bowel sounds are normal. He exhibits no distension. There is no tenderness.   Musculoskeletal: He exhibits no edema or tenderness.   Neurological: He is alert and oriented to person, place, and time. No cranial nerve deficit.   Skin: Skin is warm and dry.   Very large condyloma which is nearly circumferential around the base of his penis.  A foul odor emanates from this area.   Nursing note and vitals reviewed.      Results Review:    Results from last 7 days  Lab  Units 02/20/18 2024   WBC 10*3/mm3 14.11*   HEMOGLOBIN g/dL 15.7   HEMATOCRIT % 46.3   PLATELETS 10*3/mm3 280       Results from last 7 days  Lab Units 02/20/18 2023   SODIUM mmol/L 140   POTASSIUM mmol/L 4.5   CHLORIDE mmol/L 103   CO2 mmol/L 28.0   BUN mg/dL 16   CREATININE mg/dL 0.99   CALCIUM mg/dL 9.2   BILIRUBIN mg/dL 3.6*   ALK PHOS U/L 178*   ALT (SGPT) U/L 935*   AST (SGOT) U/L 774*   GLUCOSE mg/dL 93        I reviewed the patient's new clinical results.      Principal Problem:    Acute hepatitis B  Active Problems:    Hepatitis C    Dehydration    IV drug abuse    Condyloma acuminata    HPV (human papilloma virus) anogenital infection  Heart gallop    Assessment & Plan    The patient will be admitted.  Gastroenterology consultation for acute hepatitis B and C.  Monitor daily liver function tests.  We'll check a right upper quadrant ultrasound.  Check hepatitis C viral load and genotype.  He has a heart gallop but I do not appreciate any distinct murmur.  Given his history of IV drug abuse I will check an echocardiogram.  Repeat white blood cell count in the morning.  The condyloma around the base of his penis is quite large and he states has been rapidly worsening.  I will ask urology to see him for recommendations regarding treatment of this.  Continue supportive care overnight tonight with additional plans based on his clinical course.    I discussed the patients findings and my recommendations with patient and family    Stas Blackman MD  02/20/18  10:49 PM

## 2018-02-21 NOTE — CONSULTS
"Adult Nutrition  Assessment/PES    Patient Name:  Casimiro Herrera Jr.  YOB: 1980  MRN: [id number]  Admit Date:  2/20/2018    Assessment Date:  2/21/2018    Comments:  Nutrition Assessment completed.  Pt ate 100% of breakfast this am and states he can eat!!          Reason for Assessment       02/21/18 0805    Reason for Assessment    Reason For Assessment/Visit identified at risk by screening criteria    Identified At Risk By Screening Criteria MST SCORE 2+    Diagnosis Diagnosis    Fluid Status Dehydration    Hepatic Hepatitis;Jaundice    Substance Use Drug/illicit/recreational              Nutrition/Diet History       02/21/18 0837    Nutrition/Diet History    Typical Food/Fluid Intake good, i can eat!!!!            Anthropometrics       02/21/18 0806    Anthropometrics    Height 177.8 cm (70\")    Weight 69.9 kg (154 lb)    RD Documented Current Weight  69.9 kg (154 lb)    Ideal Body Weight (IBW)    Ideal Body Weight (IBW), Male (kg) 76.48    % Ideal Body Weight 91.53    Usual Body Weight (UBW)    Usual Body Weight 104 kg (229 lb)    % Usual Body Weight 67.25    Weight Loss 34 kg (75 lb)   75lbs     Weight Loss Time Frame 6 mo    Body Mass Index (BMI)    BMI (kg/m2) 22.14    BMI Grade 19.1 - 24.9 - normal            Labs/Tests/Procedures/Meds       02/21/18 0807    Labs/Tests/Procedures/Meds    Diagnostic Test/Procedure Review reviewed    Labs/Tests Review Reviewed;ALT;T bilirubin;BUN;WBC    Medication Review Reviewed, pertinent    Significant Vitals reviewed            Physical Findings       02/21/18 0808    Physical Findings/Assessment    Additional Documentation Physical Appearance (Group)    Physical Appearance    Skin --   intact            Estimated/Assessed Needs       02/21/18 0808    Calculation Measurements    Weight Used For Calculations 69.9 kg (154 lb 1.6 oz)    Estimated/Assessed Energy Needs    Energy Need Method Kcal/kg    kcal/kg 30    30 Kcal/Kg (kcal) 5082    Estimated/Assessed " Protein Needs    Weight Used for Protein Calculation 69.9 kg (154 lb 1.6 oz)    Protein (gm/kg) 1.0    1.0 Gm Protein (gm) 69.9    Estimated/Assessed Fluid Needs    Fluid Need Method RDA method    RDA Method (mL)  2097            Nutrition Prescription Ordered       02/21/18 0810    Nutrition Prescription PO    Current PO Diet Regular            Evaluation of Received Nutrient/Fluid Intake       02/21/18 0808    PO Evaluation    % PO Intake 100% for breakfast, and family is bring in food            Evaluation of Prescribed Nutrient/Fluid Intake       02/21/18 0811    Evaluation of Prescribed Nutrient/Fluid Intake    Nutrition Prescribed Fluid Evaluation    Fluid at Prescribed Goal    Other Fluid (mL) 2400    Total Fluid Intake (mL) 2400            Malnutrition Severity Assessment       02/21/18 0812    Malnutrition Severity Assessment    Malnutrition Type Social/Environmental Circumstance Malnutrition    Physical Signs of Malnutrition (Social/Environmental)    Muscle Wasting None    Fat Loss None    Fluid Accumulation None    Weight Status (Social/Environmental)    %UBW Severe (<75%)    Weight Loss Severe (>10% / 6 mo)    Energy Intake Status (Social/Environmental)    Energy Intake --    Criteria Met (Must meet criteria for severity in at least 2 of these categories: M Wasting, Fat Loss, Fluid, Secondary Signs, Wt. Status, Intake)    Patient meets criteria for  --   does not meet        Problem/Interventions:        Problem 1       02/21/18 0815    Nutrition Diagnoses Problem 1    Problem 1 Unintended Weight Loss    Etiology (related to) Medical Diagnosis    Hepatic Hepatitis                    Intervention Goal       02/21/18 0815    Intervention Goal    General Maintain nutrition    PO Tolerate PO;PO intake (%)    PO Intake % 80 %    Weight Maintain weight            Nutrition Intervention       02/21/18 0816    Nutrition Intervention    RD/Tech Action Follow Tx progress;Care plan reviewd            Nutrition  Prescription       02/21/18 0816    Nutrition Prescription PO    PO Prescription Begin/change supplement    Supplement Health Shake    Supplement Frequency Daily            Education/Evaluation       02/21/18 0816    Education    Education Will Instruct as appropriate    Monitor/Evaluation    Monitor Per protocol        Electronically signed by:  Natalie Alcala RD  02/21/18 8:40 AM

## 2018-02-22 PROBLEM — I87.1 INFERIOR VENA CAVAL STENOSIS: Status: RESOLVED | Noted: 2018-02-21 | Resolved: 2018-02-22

## 2018-02-22 LAB
ALBUMIN SERPL-MCNC: 3.1 G/DL (ref 3.5–5.2)
ALBUMIN/GLOB SERPL: 0.8 G/DL
ALP SERPL-CCNC: 172 U/L (ref 39–117)
ALT SERPL W P-5'-P-CCNC: 835 U/L (ref 1–41)
ANION GAP SERPL CALCULATED.3IONS-SCNC: 9.9 MMOL/L
AST SERPL-CCNC: 673 U/L (ref 1–40)
BASOPHILS # BLD AUTO: 0.06 10*3/MM3 (ref 0–0.2)
BASOPHILS NFR BLD AUTO: 0.6 % (ref 0–1.5)
BILIRUB SERPL-MCNC: 2.3 MG/DL (ref 0.1–1.2)
BUN BLD-MCNC: 9 MG/DL (ref 6–20)
BUN/CREAT SERPL: 10.6 (ref 7–25)
CALCIUM SPEC-SCNC: 8.5 MG/DL (ref 8.6–10.5)
CHLORIDE SERPL-SCNC: 103 MMOL/L (ref 98–107)
CO2 SERPL-SCNC: 25.1 MMOL/L (ref 22–29)
CREAT BLD-MCNC: 0.85 MG/DL (ref 0.76–1.27)
DEPRECATED RDW RBC AUTO: 60.4 FL (ref 37–54)
EOSINOPHIL # BLD AUTO: 0.3 10*3/MM3 (ref 0–0.7)
EOSINOPHIL NFR BLD AUTO: 2.8 % (ref 0.3–6.2)
ERYTHROCYTE [DISTWIDTH] IN BLOOD BY AUTOMATED COUNT: 17.6 % (ref 11.5–14.5)
GFR SERPL CREATININE-BSD FRML MDRD: 101 ML/MIN/1.73
GLOBULIN UR ELPH-MCNC: 4.1 GM/DL
GLUCOSE BLD-MCNC: 110 MG/DL (ref 65–99)
HCT VFR BLD AUTO: 45.9 % (ref 40.4–52.2)
HGB BLD-MCNC: 15.1 G/DL (ref 13.7–17.6)
IMM GRANULOCYTES # BLD: 0.05 10*3/MM3 (ref 0–0.03)
IMM GRANULOCYTES NFR BLD: 0.5 % (ref 0–0.5)
INR PPP: 1.24 (ref 0.9–1.1)
LYMPHOCYTES # BLD AUTO: 2.44 10*3/MM3 (ref 0.9–4.8)
LYMPHOCYTES NFR BLD AUTO: 22.5 % (ref 19.6–45.3)
MCH RBC QN AUTO: 30.9 PG (ref 27–32.7)
MCHC RBC AUTO-ENTMCNC: 32.9 G/DL (ref 32.6–36.4)
MCV RBC AUTO: 93.9 FL (ref 79.8–96.2)
MONOCYTES # BLD AUTO: 0.81 10*3/MM3 (ref 0.2–1.2)
MONOCYTES NFR BLD AUTO: 7.5 % (ref 5–12)
NEUTROPHILS # BLD AUTO: 7.2 10*3/MM3 (ref 1.9–8.1)
NEUTROPHILS NFR BLD AUTO: 66.1 % (ref 42.7–76)
PLATELET # BLD AUTO: 271 10*3/MM3 (ref 140–500)
PMV BLD AUTO: 12.1 FL (ref 6–12)
POTASSIUM BLD-SCNC: 3.8 MMOL/L (ref 3.5–5.2)
PROT SERPL-MCNC: 7.2 G/DL (ref 6–8.5)
PROTHROMBIN TIME: 15.4 SECONDS (ref 11.7–14.2)
RBC # BLD AUTO: 4.89 10*6/MM3 (ref 4.6–6)
SODIUM BLD-SCNC: 138 MMOL/L (ref 136–145)
WBC NRBC COR # BLD: 10.86 10*3/MM3 (ref 4.5–10.7)

## 2018-02-22 PROCEDURE — 87350 HEPATITIS BE AG IA: CPT | Performed by: INTERNAL MEDICINE

## 2018-02-22 PROCEDURE — 86707 HEPATITIS BE ANTIBODY: CPT | Performed by: INTERNAL MEDICINE

## 2018-02-22 PROCEDURE — 80053 COMPREHEN METABOLIC PANEL: CPT | Performed by: INTERNAL MEDICINE

## 2018-02-22 PROCEDURE — 86692 HEPATITIS DELTA AGENT ANTBDY: CPT | Performed by: INTERNAL MEDICINE

## 2018-02-22 PROCEDURE — 85025 COMPLETE CBC W/AUTO DIFF WBC: CPT | Performed by: INTERNAL MEDICINE

## 2018-02-22 PROCEDURE — 99232 SBSQ HOSP IP/OBS MODERATE 35: CPT | Performed by: INTERNAL MEDICINE

## 2018-02-22 PROCEDURE — 85610 PROTHROMBIN TIME: CPT | Performed by: INTERNAL MEDICINE

## 2018-02-22 RX ORDER — ZOLPIDEM TARTRATE 5 MG/1
5 TABLET ORAL ONCE
Status: COMPLETED | OUTPATIENT
Start: 2018-02-22 | End: 2018-02-22

## 2018-02-22 RX ORDER — ALUMINA, MAGNESIA, AND SIMETHICONE 2400; 2400; 240 MG/30ML; MG/30ML; MG/30ML
15 SUSPENSION ORAL EVERY 6 HOURS PRN
Status: DISCONTINUED | OUTPATIENT
Start: 2018-02-22 | End: 2018-02-23 | Stop reason: HOSPADM

## 2018-02-22 RX ORDER — PANTOPRAZOLE SODIUM 40 MG/1
40 TABLET, DELAYED RELEASE ORAL EVERY MORNING
Status: DISCONTINUED | OUTPATIENT
Start: 2018-02-22 | End: 2018-02-23 | Stop reason: HOSPADM

## 2018-02-22 RX ORDER — ONDANSETRON 2 MG/ML
4 INJECTION INTRAMUSCULAR; INTRAVENOUS EVERY 6 HOURS PRN
Status: DISCONTINUED | OUTPATIENT
Start: 2018-02-22 | End: 2018-02-23 | Stop reason: HOSPADM

## 2018-02-22 RX ORDER — NICOTINE 21 MG/24HR
1 PATCH, TRANSDERMAL 24 HOURS TRANSDERMAL EVERY 24 HOURS
Status: DISCONTINUED | OUTPATIENT
Start: 2018-02-22 | End: 2018-02-23 | Stop reason: HOSPADM

## 2018-02-22 RX ADMIN — ZOLPIDEM TARTRATE 5 MG: 5 TABLET ORAL at 22:32

## 2018-02-22 RX ADMIN — ALUMINUM HYDROXIDE, MAGNESIUM HYDROXIDE, AND DIMETHICONE 15 ML: 400; 400; 40 SUSPENSION ORAL at 19:52

## 2018-02-22 RX ADMIN — SODIUM CHLORIDE 100 ML/HR: 9 INJECTION, SOLUTION INTRAVENOUS at 05:35

## 2018-02-22 RX ADMIN — ALUMINUM HYDROXIDE, MAGNESIUM HYDROXIDE, AND DIMETHICONE 15 ML: 400; 400; 40 SUSPENSION ORAL at 01:00

## 2018-02-22 NOTE — PROGRESS NOTES
[facility]/GI Progress Note   Chief Complaint:  Hepatitis B and C    Subjective     Interval History:   Pt denies GI complaints at this time. His only concern is going outside to smoke a cigarette. He does report a history of indigestion and heartburn with a previous dx of GERD.   Reports he has appointment with Dr Dangelo to treat hepatitis in March.     History taken from: patient chart family    Review of Systems:    The following systems were reviewed and negative;  gastrointestinal    Objective     Vital Signs  Temp:  [97.3 °F (36.3 °C)-98.2 °F (36.8 °C)] 97.3 °F (36.3 °C)  Heart Rate:  [60-72] 64  Resp:  [16] 16  BP: ()/(59-76) 98/59  Body mass index is 22.1 kg/(m^2).    Intake/Output Summary (Last 24 hours) at 02/22/18 0935  Last data filed at 02/22/18 0535   Gross per 24 hour   Intake             2840 ml   Output                0 ml   Net             2840 ml          Physical Exam:   General: patient awake, alert and cooperative   Eyes: Normal lids and lashes, no scleral icterus, no conjunctival pallor   Neck: supple, normal ROM, no tracheal deviation, no thyromegaly   Skin: warm and dry, not jaundiced   Cardiovascular: regular rhythm and rate, no murmurs auscultated   Pulm: clear to auscultation bilaterally, regular and unlabored   Abdomen: soft, nontender, nondistended; normal bowel sounds   Rectal: deferred   Extremities: no rash or edema   Neurologic: Normal mood and behavior    All Medications Have Been Reviewed     Results Review:       Results from last 7 days  Lab Units 02/22/18 0412 02/21/18 0318 02/20/18 2024   WBC 10*3/mm3 10.86* 11.29* 14.11*   HEMOGLOBIN g/dL 15.1 14.6 15.7   HEMATOCRIT % 45.9 43.1 46.3   PLATELETS 10*3/mm3 271 259 280         Results from last 7 days  Lab Units 02/22/18 0412 02/21/18 0318 02/20/18 2023   SODIUM mmol/L 138 139 140   POTASSIUM mmol/L 3.8 3.6 4.5   CHLORIDE mmol/L 103 103 103   CO2 mmol/L 25.1 24.0 28.0   BUN mg/dL 9 13 16   CREATININE mg/dL 0.85 0.94  0.99   CALCIUM mg/dL 8.5* 8.3* 9.2   BILIRUBIN mg/dL 2.3* 2.6* 3.6*   ALK PHOS U/L 172* 160* 178*   ALT (SGPT) U/L 835* 730* 935*   AST (SGOT) U/L 673* 593* 774*   GLUCOSE mg/dL 110* 107* 93         Results from last 7 days  Lab Units 02/22/18  0412 02/21/18  0318 02/20/18 2023   INR  1.24* 1.29* 1.23*       RADIOLOGY:    Imaging Results (last 72 hours)     Procedure Component Value Units Date/Time    CT Angiogram Abdomen With & Without Contrast [[id number]] Collected:  02/21/18 1458     Updated:  02/21/18 1645    Narrative:       CT ANGIOGRAPHY OF THE ABDOMEN WITH INTRAVENOUS CONTRAST AND 3D  RECONSTRUCTIONS     HISTORY: Elevated liver function tests. Has recent ultrasound evaluation  raising the concern of nonoccluding thrombus in IVC near liver.     The CT scan was performed with CT angiography protocol through the  abdomen using intravenous contrast and 3D reconstructions. The following  findings are present:  1. The inferior vena cava is well opacified and there is no evidence of  thrombus. The aorta and mesenteric vessels are unremarkable. The adrenal  glands and kidneys also appear normal.     2. The liver is normal in size and homogeneous. The spleen also appears  normal. There is localized fluid surrounding the gallbladder and this  raises the concern of cholecystitis despite the absence of gallstones by  CT scan or by ultrasound. Further evaluation with a biliary isotope scan  may be helpful.     3. The spleen and pancreas are also unremarkable. The visualized large  and small bowel loops appear normal.                 Radiation dose reduction techniques were utilized, including automated  exposure control and exposure modulation based on body size.     This report was finalized on 2/21/2018 4:42 PM by Dr. Bryan Gardner MD.        Liver [[id number]] Collected:  02/21/18 0719     Updated:  02/22/18 0544    Narrative:       LIVER ULTRASOUND     CLINICAL HISTORY: Abnormal liver enzymes, hepatitis.      FINDINGS: Longitudinal and transverse images of the liver obtained.  Liver is homogeneous and normal in echotexture. No mass or biliary  dilation. The adjacent inferior vena cava contains some echogenic  material along its posterior wall concerning for thrombus. This is well  seen on image 24. There clearly is flow adjacent to the echogenic  material suggesting it is only partially occluding. Contrast CT could  better evaluate. Gallbladder partially contracted with wall thickening  measuring up to 4 mm. No shadowing gallstones. Common duct measures 4  mm. Pancreas not well seen. Right kidney without hydronephrosis.       Impression:       1. Unremarkable liver.  2. Echogenic material within the inferior vena cava concerning for  partially occluding thrombus.  3. Contracted, thick-walled gallbladder without shadowing gallstones.     This report was finalized on 2/22/2018 5:41 AM by Kennedy Biswas MD.             Assessment/Plan     Patient Active Problem List   Diagnosis Code   • Acute hepatitis B B16.9   • Hepatitis C B17.10   • Dehydration E86.0   • IV drug abuse F19.10   • Condyloma acuminata A63.0   • HPV (human papilloma virus) anogenital infection A63.0         Polly Cooley, SHANE  02/22/18  9:35 AM      Patient seen and examined, discussed CT angio and ultrasound findings with radiologist.  Reviewed findings with the patient.  No current clinical symptoms of gallbladder disease, will defer workup unless he becomes symptomatic.  He is scheduled to follow up with Dr. Jesse Dangelo for treatment of his hepatitis.      Impression   1. Hepatitis B and C- viral load and hepatic serologies   2. Abnormal US liver- No vena cava thrombus and/or stenosis on CTA. CT did show fluid surrounding GB with ?cholecystitis. No gallstones  3. Elevated LFTs   4.  IV drug use    Plan  Patient denies abdominal pain, nausea or vomiting presently   Viral loads pending.   Monitor LFTs daily   Patient has an appointment with   Yoel Dangelo on March 8, 2018

## 2018-02-22 NOTE — PLAN OF CARE
"Problem: Patient Care Overview (Adult)  Goal: Plan of Care Review  Outcome: Ongoing (interventions implemented as appropriate)   02/22/18 0543   Coping/Psychosocial Response Interventions   Plan Of Care Reviewed With patient   Patient Care Overview   Progress progress toward functional goals as expected   Outcome Evaluation   Outcome Summary/Follow up Plan A&Ox4. Episode x1 of indigestion with good relief noted with prn Maalox. Removed nicotine patch prior to this shift. States \" it doesn't work\". Continues to desire to smoke .     Goal: Discharge Needs Assessment  Outcome: Ongoing (interventions implemented as appropriate)      Problem: Fall Risk (Adult)  Goal: Absence of Falls  Outcome: Ongoing (interventions implemented as appropriate)      Problem: Pain, Acute (Adult)  Goal: Acceptable Pain Control/Comfort Level  Outcome: Ongoing (interventions implemented as appropriate)      Problem: Nutrition, Imbalanced: Inadequate Oral Intake (Adult)  Goal: Identify Related Risk Factors and Signs and Symptoms  Outcome: Outcome(s) achieved Date Met: 02/22/18    Goal: Improved Oral Intake  Outcome: Ongoing (interventions implemented as appropriate)    Goal: Prevent Further Weight Loss  Outcome: Ongoing (interventions implemented as appropriate)        "

## 2018-02-22 NOTE — CONSULTS
[facility]  Manuelito Cain MD    Patient Care Team:  No Known Provider as PCP - General    Chief complaint: penile warts    Subjective .     History of present illness:  This 37-year-old male was admitted with multiple medical issues including acute hepatitis evidently.  He was in long-term drug user.  He relates that he's had penile lesions for 12 years.  However they've been growing more rapidly lately.  Actually tender.    He voids fine.  No gross hematuria or dysuria.    Review of Systems  All systems were reviewed and were negative except for loss of energy, no chest pain.  No shortness of air.  No abdominal pain.  No new extremity, joint, muscular skeletal, ENT, neurologic or skin symptoms.    History  History reviewed. No pertinent past medical history., Past Surgical History:   Procedure Laterality Date   • HERNIA REPAIR     • TONSILLECTOMY     , History reviewed. No pertinent family history., Social History   Substance Use Topics   • Smoking status: Current Every Day Smoker   • Smokeless tobacco: Never Used   • Alcohol use No   , No prescriptions prior to admission.   , Scheduled Meds:    nicotine 1 patch Transdermal Q24H   pantoprazole 40 mg Oral QAM   , Continuous Infusions:   , PRN Meds:  •  aluminum-magnesium hydroxide-simethicone  •  ondansetron  •  pneumococcal polysaccharide 23-valent  •  sodium chloride  •  Insert peripheral IV **AND** sodium chloride, Allergies:  Review of patient's allergies indicates no known allergies. and     Objective     Vital Signs   Temp:  [97.3 °F (36.3 °C)-97.9 °F (36.6 °C)] 97.9 °F (36.6 °C)  Heart Rate:  [64-75] 75  Resp:  [16] 16  BP: ()/(59-75) 115/75    Physical Exam:     General Appearance:    Alert, cooperative, in no acute distress   Head:    Normocephalic, without obvious abnormality, atraumatic   Eyes:            Lids and lashes normal, conjunctivae and sclerae normal, no   icterus, no pallor, corneas clear, PERRLA   Ears:    Ears appear intact  with no abnormalities noted   Throat:   No oral lesions, no thrush, oral mucosa moist   Neck:   No adenopathy, supple, trachea midline,    Back:     No kyphosis present, no scoliosis present, no skin lesions,      erythema or scars, no tenderness to percussion or                   palpation,   range of motion normal   Lungs:     Clear to auscultation,respirations regular, even and                  unlabored    Heart:    Regular rhythm and normal rate, normal S1 and S2, no            murmur, no gallop, no rub, no click   Chest Wall:    No abnormalities observed   Abdomen:     Normal bowel sounds, no masses, no organomegaly, soft        non-tender, non-distended, no guarding, no rebound                tenderness   Genital/Rectal:     Multiple bulky exophytic erythematous giant penile warts.  This extensively involves the base of the penis and dorsum of the penis especially on the left side and extending over into the scrotal crease.  He has a 3 cm lesion in his suprapubic area on the right side.  He has more warts involving the left side and ventral part of the penis.  The glans and meatus look fine.     Extremities:   Moves all extremities well, no edema, no cyanosis, no             redness       Skin:   No bleeding, bruising or rash       Neurologic:   Cranial nerves 2 - 12 grossly intact, sensation intact,       Results Review:   I reviewed the patient's new clinical results.      Assessment/Plan     Principal Problem:    Acute viral hepatitis B without hepatic coma  Active Problems:    Hepatitis C    Dehydration    IV drug abuse    Condyloma acuminata    HPV (human papilloma virus) anogenital infection      Large extensive bulky penile warts    I discussed the patients findings and my recommendations with patient.  I have explained that surgery remove these lesions would be extensive.  I've also explained that they may have disintegrated into carcinoma of the penis.    At this point he is not a candidate for  surgery with his acute hepatitis.    At the time of surgery he may need skin grafting.    02/22/18  5:25 PM    Time: 40+ minutes reviewing records, history and physical, discussion of findings and beginning of formulation of plan.

## 2018-02-22 NOTE — PROGRESS NOTES
Name: Casimiro Herrera Jr. ADMIT: 2018   : 1980  PCP: No Known Provider    MRN: [id number] LOS: 2 days   AGE/SEX: 37 y.o. male  ROOM: [facility]/   Subjective   Subjective  CC: fatigue  No acute events. Patient overall feels better.  Anxious to go home.  Taking PO well, no n/v/d.  No confusion, jaundice better.    Objective   Vital Signs  Temp:  [97.3 °F (36.3 °C)-97.9 °F (36.6 °C)] 97.9 °F (36.6 °C)  Heart Rate:  [64-75] 75  Resp:  [16] 16  BP: ()/(59-75) 115/75  SpO2:  [96 %] 96 %  on   ;   O2 Device: room air  Body mass index is 22.1 kg/(m^2).    Physical Exam   Constitutional: He is oriented to person, place, and time. No distress.   HENT:   Head: Normocephalic and atraumatic.   Mouth/Throat: Oropharynx is clear and moist.   Eyes: Conjunctivae and EOM are normal. Pupils are equal, round, and reactive to light.   Neck: Normal range of motion. Neck supple. No JVD present.   Cardiovascular: Normal rate, regular rhythm and intact distal pulses.    Pulmonary/Chest: Effort normal and breath sounds normal.   Abdominal: Soft. Bowel sounds are normal. There is no tenderness.   Musculoskeletal: He exhibits no edema or tenderness.   Neurological: He is alert and oriented to person, place, and time. He displays no tremor (no asterixis).   Skin: Skin is warm and dry. He is not diaphoretic.   Psychiatric: He has a normal mood and affect. His behavior is normal.   Nursing note and vitals reviewed.      Results Review:       I reviewed the patient's new clinical results.    Results from last 7 days  Lab Units 18  1841   WBC 10*3/mm3 10.86* 11.29* 14.11* 11.7*   HEMOGLOBIN g/dL 15.1 14.6 15.7 16.7   PLATELETS 10*3/mm3 271 259 280 303     Results from last 7 days  Lab Units 18 02/19/18  184   SODIUM mmol/L 138 139 140 136   POTASSIUM mmol/L 3.8 3.6 4.5 4.5   CHLORIDE mmol/L 103 103 103 95*   CO2 mmol/L 25.1 24.0 28.0  --     TOTAL CO2, ARTERIAL mmol/L  --   --   --  22   BUN mg/dL 9 13 16 23*   CREATININE mg/dL 0.85 0.94 0.99 1.23   GLUCOSE mg/dL 110* 107* 93  --    Estimated Creatinine Clearance: 116.5 mL/min (by C-G formula based on Cr of 0.85).  Results from last 7 days  Lab Units 02/22/18  0412 02/21/18  0318 02/20/18 2023 02/19/18  1841   CALCIUM mg/dL 8.5* 8.3* 9.2 9.5   ALBUMIN g/dL 3.10* 3.00* 3.50 4.2         nicotine 1 patch Transdermal Q24H   pantoprazole 40 mg Oral QAM       sodium chloride 100 mL/hr Last Rate: 100 mL/hr (02/22/18 0535)   Diet Regular      Assessment/Plan   Active Hospital Problems (** Indicates Principal Problem)    Diagnosis Date Noted   • **Acute viral hepatitis B without hepatic coma [B16.9] 02/20/2018   • Hepatitis C [B17.10] 02/20/2018   • Dehydration [E86.0] 02/20/2018   • IV drug abuse [F19.10] 02/20/2018   • Condyloma acuminata [A63.0] 02/20/2018   • HPV (human papilloma virus) anogenital infection [A63.0] 02/20/2018      Resolved Hospital Problems    Diagnosis Date Noted Date Resolved   • 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 Dangelo 3/8/18    Condyloma  - urology is consulted    DVT Prophylaxis  - SCDs      Kennedy Boss MD  [facility]  02/22/18  3:42 PM

## 2018-02-22 NOTE — PROGRESS NOTES
LOS: 2 days   Patient Care Team:  No Known Provider as PCP - General    Chief Complaint: Fatigue and nausea better    Subjective     37 y.o. male asked to see because of abnormal ultrasound of liver showing possible inferior vena cava thrombus and/or stenosis.  CT angiogram of abdomen with venous phase reviewed and normal vena cava with no evidence of thrombus.  Patient also with bilateral lower extremity venous duplex scan which was normal with no evidence of thrombus.    Echocardiogram with normal ejection fraction 58% and good ventricular function.  There were a few bubbles small right to left a difficult to determine.  Further evaluation of this per medicine.    The CT scan however did show fluid surrounding the gallbladder and raises concern for cholecystitis but no gallstones.  Radiologist suggested biliary isotope scan but will leave that up to medicine and gastroenterology.    Patient with possible hepatitis with plans per medicine and gastroenterology.    Patient is a smoker and counseled on quitting.  Patient with stable penile condyloma.  He is a  and occasional ethanol use.  No family history of hepatitis.    Review of Systems  Review of Systems - Negative except history of present illness      Objective     Vital Signs  Temp:  [97.5 °F (36.4 °C)-98.2 °F (36.8 °C)] 98.2 °F (36.8 °C)  Heart Rate:  [60-72] 72  Resp:  [16-18] 16  BP: (114-121)/(75-76) 121/76    Physical Exam  General: No acute distress. Alert and oriented x 4  HEENT: No jugular venous distension, trachea is midline  CV: RRR, S1S2  Resp: Clear unlabored breathing  Abd: Abdomen is soft, nontender, nondistended  Extremities: Palpable pulses with no leg swelling    Results Review:       Recent Results (from the past 24 hour(s))   Adult Transthoracic Echo Complete W/ Cont if Necessary Per Protocol    Collection Time: 02/21/18 12:03 PM   Result Value Ref Range    BSA 1.9 m^2    IVSd 0.9 cm    LVIDd 4.4 cm    LVIDs 3.4 cm     LVPWd 1.0 cm    IVS/LVPW 0.9     FS 22.7 %    EDV(Teich) 87.7 ml    ESV(Teich) 47.4 ml    EF(Teich) 45.9 %    EDV(cubed) 85.2 ml    ESV(cubed) 39.3 ml    EF(cubed) 53.9 %    LV mass(C)d 137.8 grams    LV mass(C)dI 73.8 grams/m^2    SV(Teich) 40.3 ml    SI(Teich) 21.6 ml/m^2    SV(cubed) 45.9 ml    SI(cubed) 24.6 ml/m^2    Ao root diam 3.0 cm    Ao root area 7.1 cm^2    ACS 2.2 cm    LA dimension 2.8 cm    LA/Ao 0.93     LVOT diam 2.1 cm    LVOT area 3.5 cm^2    LVOT area(traced) 3.5 cm^2    RVOT diam 1.8 cm    RVOT area 2.5 cm^2    LVLd ap4 8.7 cm    EDV(MOD-sp4) 86.0 ml    LVLs ap4 6.8 cm    ESV(MOD-sp4) 47.0 ml    EF(MOD-sp4) 58 %    LVLd ap2 8.5 cm    EDV(MOD-sp2) 92.0 ml    LVLs ap2 7.2 cm    ESV(MOD-sp2) 52.0 ml    EF(MOD-sp2) 43.5 %    SV(MOD-sp4) 39.0 ml    SI(MOD-sp4) 20.9 ml/m^2    SV(MOD-sp2) 40.0 ml    SI(MOD-sp2) 21.4 ml/m^2    Ao root area (BSA corrected) 1.6     Ao root area (BSA corrected) 43.5 ml/m^2    CONTRAST EF 4CH 45.3 ml/m^2    LV Diastolic corrected for BSA 46.0 ml/m^2    LV Systolic corrected for BSA 25.2 ml/m^2    MV A dur 0.1 sec    MV E max luis 79.5 cm/sec    MV A max luis 56.3 cm/sec    MV E/A 1.4     MV V2 mean 56.8 cm/sec    MV mean PG 1.0 mmHg    MV V2 VTI 26.5 cm    MVA(VTI) 2.6 cm^2    MV P1/2t max luis 81.4 cm/sec    MV P1/2t 85.8 msec    MVA(P1/2t) 2.6 cm^2    MV dec slope 278.0 cm/sec^2    MV dec time 0.2 sec    Ao V2 mean 96.0 cm/sec    Ao mean PG 4.0 mmHg    Ao mean PG (full) 1.0 mmHg    Ao V2 VTI 30.0 cm    TATE(I,A) 2.3 cm^2    TATE(I,D) 2.3 cm^2    LV V1 mean PG 3.0 mmHg    LV V1 mean 77.1 cm/sec    LV V1 VTI 20.1 cm    SV(Ao) 212.1 ml    SI(Ao) 113.5 ml/m^2    SV(LVOT) 69.6 ml    SV(RVOT) 31.0 ml    SI(LVOT) 37.3 ml/m^2    PA V2 max 75.6 cm/sec    PA max PG 2.3 mmHg    PA max PG (full) 1.0 mmHg    BH CV ECHO ESTRELLITA - PVA(V,A) 1.9 cm^2    BH CV ECHO ESTRELLITA - PVA(V,D) 1.9 cm^2    PA acc slope 0 cm/sec^2    PA acc time 0.1 sec    RV V1 max PG 1.2 mmHg    RV V1 mean PG 1.0 mmHg    RV V1  max 55.6 cm/sec    RV V1 mean 40.0 cm/sec    RV V1 VTI 12.2 cm    TR max davis 202.0 cm/sec    PA pr(Accel) 36.3 mmHg    Pulm Sys Davis 61.7 cm/sec    Pulm Chapman Davis 50.8 cm/sec    Pulm S/D 1.2     Qp/Qs 0.45     Pulm A Revs Dur 0.09 sec    Pulm A Revs Davis 34.1 cm/sec    MVA P1/2T LCG 2.7 cm^2     CV ECHO ESTRELLITA - BZI_BMI 22.1 kilograms/m^2     CV ECHO ESTRELLITA - BSA([facility]) 1.9 m^2     CV ECHO ESTRELLITA - BZI_METRIC_WEIGHT 69.9 kg     CV ECHO ESTRELLITA - BZI_METRIC_HEIGHT 177.8 cm    Target HR (85%) 155 bpm    Max. Pred. HR (100%) 182 bpm     CV VAS BP RIGHT /75 mmHg    TDI S' 13.00 cm/sec    RV Base 2.50 cm    Aortic arch 2.60 cm    Dimensionless Index 0.7 (DI)    E/E' ratio 7.0     LA Volume Index 19.0 mL/m2    Ao pk davis 122.0 cm/sec    Lat Peak E' Davis 12.0 cm/sec    LV V1 max 104.0 cm/sec    Med Peak E' Davis 11.00 cm/sec    RAP systole 3 mmHg    RVSP(TR) 19 mmHg    Abdo Ao Diam 1.58 cm    TAPSE (>1.6) 2.40 cm2    Echo EF Estimated 58 %   Duplex Venous Lower Extremity - Bilateral CAR    Collection Time: 02/21/18 12:40 PM   Result Value Ref Range    Right Common Femoral Spont Y     Right Common Femoral Phasic Y     Right Common Femoral Augment Y     Right Common Femoral Competent Y     Right Common Femoral Compress C     Right Saphenofemoral Junction Spont Y     Right Saphenofemoral Junction Phasic Y     Right Saphenofemoral Junction Augment Y     Right Saphenofemoral Junction Competent Y     Right Saphenofemoral Junction Compress C     Right Proximal Femoral Compress C     Right Mid Femoral Spont Y     Right Mid Femoral Phasic Y     Right Mid Femoral Augment Y     Right Mid Femoral Competent Y     Right Mid Femoral Compress C     Right Distal Femoral Compress C     Right Popliteal Spont Y     Right Popliteal Phasic Y     Right Popliteal Augment Y     Right Popliteal Competent Y     Right Popliteal Compress C     Right Posterior Tibial Compress C     Right Peroneal Compress C     Right GastronemiusSoleal Compress C      Right Greater Saph AK Compress C     Right Greater Saph BK Compress C     Left Common Femoral Spont Y     Left Common Femoral Phasic Y     Left Common Femoral Augment Y     Left Common Femoral Competent Y     Left Common Femoral Compress C     Left Saphenofemoral Junction Spont Y     Left Saphenofemoral Junction Phasic Y     Left Saphenofemoral Junction Augment Y     Left Saphenofemoral Junction Competent Y     Left Saphenofemoral Junction Compress C     Left Proximal Femoral Compress C     Left Mid Femoral Spont Y     Left Mid Femoral Phasic Y     Left Mid Femoral Augment Y     Left Mid Femoral Competent Y     Left Mid Femoral Compress C     Left Distal Femoral Compress C     Left Popliteal Spont Y     Left Popliteal Phasic Y     Left Popliteal Augment Y     Left Popliteal Competent Y     Left Popliteal Compress C     Left Posterior Tibial Compress C     Left Peroneal Compress C     Left GastronemiusSoleal Compress C     Left Greater Saph AK Compress C     Left Greater Saph BK Compress C    Comprehensive Metabolic Panel    Collection Time: 02/22/18  4:12 AM   Result Value Ref Range    Glucose 110 (H) 65 - 99 mg/dL    BUN 9 6 - 20 mg/dL    Creatinine 0.85 0.76 - 1.27 mg/dL    Sodium 138 136 - 145 mmol/L    Potassium 3.8 3.5 - 5.2 mmol/L    Chloride 103 98 - 107 mmol/L    CO2 25.1 22.0 - 29.0 mmol/L    Calcium 8.5 (L) 8.6 - 10.5 mg/dL    Total Protein 7.2 6.0 - 8.5 g/dL    Albumin 3.10 (L) 3.50 - 5.20 g/dL    ALT (SGPT) 835 (H) 1 - 41 U/L    AST (SGOT) 673 (H) 1 - 40 U/L    Alkaline Phosphatase 172 (H) 39 - 117 U/L    Total Bilirubin 2.3 (H) 0.1 - 1.2 mg/dL    eGFR Non African Amer 101 >60 mL/min/1.73    Globulin 4.1 gm/dL    A/G Ratio 0.8 g/dL    BUN/Creatinine Ratio 10.6 7.0 - 25.0    Anion Gap 9.9 mmol/L   Protime-INR    Collection Time: 02/22/18  4:12 AM   Result Value Ref Range    Protime 15.4 (H) 11.7 - 14.2 Seconds    INR 1.24 (H) 0.90 - 1.10   CBC Auto Differential    Collection Time: 02/22/18  4:12 AM    Result Value Ref Range    WBC 10.86 (H) 4.50 - 10.70 10*3/mm3    RBC 4.89 4.60 - 6.00 10*6/mm3    Hemoglobin 15.1 13.7 - 17.6 g/dL    Hematocrit 45.9 40.4 - 52.2 %    MCV 93.9 79.8 - 96.2 fL    MCH 30.9 27.0 - 32.7 pg    MCHC 32.9 32.6 - 36.4 g/dL    RDW 17.6 (H) 11.5 - 14.5 %    RDW-SD 60.4 (H) 37.0 - 54.0 fl    MPV 12.1 (H) 6.0 - 12.0 fL    Platelets 271 140 - 500 10*3/mm3    Neutrophil % 66.1 42.7 - 76.0 %    Lymphocyte % 22.5 19.6 - 45.3 %    Monocyte % 7.5 5.0 - 12.0 %    Eosinophil % 2.8 0.3 - 6.2 %    Basophil % 0.6 0.0 - 1.5 %    Immature Grans % 0.5 0.0 - 0.5 %    Neutrophils, Absolute 7.20 1.90 - 8.10 10*3/mm3    Lymphocytes, Absolute 2.44 0.90 - 4.80 10*3/mm3    Monocytes, Absolute 0.81 0.20 - 1.20 10*3/mm3    Eosinophils, Absolute 0.30 0.00 - 0.70 10*3/mm3    Basophils, Absolute 0.06 0.00 - 0.20 10*3/mm3    Immature Grans, Absolute 0.05 (H) 0.00 - 0.03 10*3/mm3   ]      Assessment/Plan           Principal Problem:    Acute hepatitis B  Active Problems:    Hepatitis C    Dehydration    IV drug abuse    Condyloma acuminata    HPV (human papilloma virus) anogenital infection    Inferior vena caval stenosis      Assessment & Plan  37 y.o. male with no evidence of vena cava thrombosis or stenosis on CT angiogram with no evidence of clot of lower extremities on venous duplex scan area CT scan with possible cholecystitis and recommended biliary scan.  Plans for evaluation of this and hepatitis and further treatment per medicine and gastroenterology.  I will see as needed.      Ricardo Lewis MD  02/22/18  6:59 AM

## 2018-02-23 VITALS
RESPIRATION RATE: 16 BRPM | DIASTOLIC BLOOD PRESSURE: 87 MMHG | WEIGHT: 182.2 LBS | SYSTOLIC BLOOD PRESSURE: 123 MMHG | TEMPERATURE: 98 F | HEART RATE: 62 BPM | OXYGEN SATURATION: 96 % | BODY MASS INDEX: 26.08 KG/M2 | HEIGHT: 70 IN

## 2018-02-23 LAB
ALBUMIN SERPL-MCNC: 3.2 G/DL (ref 3.5–5.2)
ALBUMIN/GLOB SERPL: 0.7 G/DL
ALP SERPL-CCNC: 166 U/L (ref 39–117)
ALT SERPL W P-5'-P-CCNC: 892 U/L (ref 1–41)
ANION GAP SERPL CALCULATED.3IONS-SCNC: 7.3 MMOL/L
AST SERPL-CCNC: 770 U/L (ref 1–40)
BILIRUB SERPL-MCNC: 2.7 MG/DL (ref 0.1–1.2)
BUN BLD-MCNC: 11 MG/DL (ref 6–20)
BUN/CREAT SERPL: 13.4 (ref 7–25)
CALCIUM SPEC-SCNC: 8.9 MG/DL (ref 8.6–10.5)
CHLORIDE SERPL-SCNC: 104 MMOL/L (ref 98–107)
CO2 SERPL-SCNC: 21.7 MMOL/L (ref 22–29)
CREAT BLD-MCNC: 0.82 MG/DL (ref 0.76–1.27)
GFR SERPL CREATININE-BSD FRML MDRD: 105 ML/MIN/1.73
GLOBULIN UR ELPH-MCNC: 4.3 GM/DL
GLUCOSE BLD-MCNC: 95 MG/DL (ref 65–99)
HBV E AB SERPL QL IA: NEGATIVE
HBV E AG SERPL QL IA: POSITIVE
HCV RNA SERPL NAA+PROBE-ACNC: NORMAL IU/ML
INR PPP: 1.21 (ref 0.9–1.1)
POTASSIUM BLD-SCNC: 3.8 MMOL/L (ref 3.5–5.2)
PROT SERPL-MCNC: 7.5 G/DL (ref 6–8.5)
PROTHROMBIN TIME: 15.1 SECONDS (ref 11.7–14.2)
SODIUM BLD-SCNC: 133 MMOL/L (ref 136–145)
TEST INFORMATION: NORMAL

## 2018-02-23 PROCEDURE — 99231 SBSQ HOSP IP/OBS SF/LOW 25: CPT | Performed by: INTERNAL MEDICINE

## 2018-02-23 PROCEDURE — 80053 COMPREHEN METABOLIC PANEL: CPT | Performed by: INTERNAL MEDICINE

## 2018-02-23 PROCEDURE — 85610 PROTHROMBIN TIME: CPT | Performed by: INTERNAL MEDICINE

## 2018-02-23 RX ORDER — NICOTINE 21 MG/24HR
1 PATCH, TRANSDERMAL 24 HOURS TRANSDERMAL EVERY 24 HOURS
Qty: 30 PATCH | Refills: 0 | Status: SHIPPED | OUTPATIENT
Start: 2018-02-23 | End: 2019-11-18

## 2018-02-23 RX ADMIN — PANTOPRAZOLE SODIUM 40 MG: 40 TABLET, DELAYED RELEASE ORAL at 06:09

## 2018-02-23 NOTE — PROGRESS NOTES
BGA/GI Progress Note   Chief Complaint:  Hepatitis B and C    Subjective     Interval History:   Pt denies GI complaints       History taken from: patient chart family    Review of Systems:    The following systems were reviewed and negative;  gastrointestinal    Objective     Vital Signs  Temp:  [97.3 °F (36.3 °C)-98.6 °F (37 °C)] 97.3 °F (36.3 °C)  Heart Rate:  [58-77] 58  Resp:  [16] 16  BP: ()/(50-87) 88/50  Body mass index is 26.14 kg/(m^2).    Intake/Output Summary (Last 24 hours) at 02/23/18 0947  Last data filed at 02/22/18 1913   Gross per 24 hour   Intake             1050 ml   Output                0 ml   Net             1050 ml          Physical Exam:   General: patient awake, alert and cooperative   Eyes: Normal lids and lashes, no scleral icterus, no conjunctival pallor   Neck: supple, normal ROM, no tracheal deviation, no thyromegaly   Skin: warm and dry, not jaundiced   Cardiovascular: regular rhythm and rate, no murmurs auscultated   Pulm: clear to auscultation bilaterally, regular and unlabored   Abdomen: soft, nontender, nondistended; normal bowel sounds   Rectal: deferred   Extremities: no rash or edema   Neurologic: Normal mood and behavior    All Medications Have Been Reviewed     Results Review:       Results from last 7 days  Lab Units 02/22/18  0412 02/21/18 0318 02/20/18 2024   WBC 10*3/mm3 10.86* 11.29* 14.11*   HEMOGLOBIN g/dL 15.1 14.6 15.7   HEMATOCRIT % 45.9 43.1 46.3   PLATELETS 10*3/mm3 271 259 280         Results from last 7 days  Lab Units 02/23/18  0325 02/22/18  0412 02/21/18 0318   SODIUM mmol/L 133* 138 139   POTASSIUM mmol/L 3.8 3.8 3.6   CHLORIDE mmol/L 104 103 103   CO2 mmol/L 21.7* 25.1 24.0   BUN mg/dL 11 9 13   CREATININE mg/dL 0.82 0.85 0.94   CALCIUM mg/dL 8.9 8.5* 8.3*   BILIRUBIN mg/dL 2.7* 2.3* 2.6*   ALK PHOS U/L 166* 172* 160*   ALT (SGPT) U/L 892* 835* 730*   AST (SGOT) U/L 770* 673* 593*   GLUCOSE mg/dL 95 110* 107*         Results from last 7  days  Lab Units 02/23/18  0325 02/22/18  0412 02/21/18  0318   INR  1.21* 1.24* 1.29*       RADIOLOGY:    Imaging Results (last 72 hours)     Procedure Component Value Units Date/Time    CT Angiogram Abdomen With & Without Contrast [[id number]] Collected:  02/21/18 1458     Updated:  02/21/18 1645    Narrative:       CT ANGIOGRAPHY OF THE ABDOMEN WITH INTRAVENOUS CONTRAST AND 3D  RECONSTRUCTIONS     HISTORY: Elevated liver function tests. Has recent ultrasound evaluation  raising the concern of nonoccluding thrombus in IVC near liver.     The CT scan was performed with CT angiography protocol through the  abdomen using intravenous contrast and 3D reconstructions. The following  findings are present:  1. The inferior vena cava is well opacified and there is no evidence of  thrombus. The aorta and mesenteric vessels are unremarkable. The adrenal  glands and kidneys also appear normal.     2. The liver is normal in size and homogeneous. The spleen also appears  normal. There is localized fluid surrounding the gallbladder and this  raises the concern of cholecystitis despite the absence of gallstones by  CT scan or by ultrasound. Further evaluation with a biliary isotope scan  may be helpful.     3. The spleen and pancreas are also unremarkable. The visualized large  and small bowel loops appear normal.                 Radiation dose reduction techniques were utilized, including automated  exposure control and exposure modulation based on body size.     This report was finalized on 2/21/2018 4:42 PM by Dr. Bryan Gardner MD.        Liver [[id number]] Collected:  02/21/18 0719     Updated:  02/22/18 0544    Narrative:       LIVER ULTRASOUND     CLINICAL HISTORY: Abnormal liver enzymes, hepatitis.     FINDINGS: Longitudinal and transverse images of the liver obtained.  Liver is homogeneous and normal in echotexture. No mass or biliary  dilation. The adjacent inferior vena cava contains some echogenic  material along its  posterior wall concerning for thrombus. This is well  seen on image 24. There clearly is flow adjacent to the echogenic  material suggesting it is only partially occluding. Contrast CT could  better evaluate. Gallbladder partially contracted with wall thickening  measuring up to 4 mm. No shadowing gallstones. Common duct measures 4  mm. Pancreas not well seen. Right kidney without hydronephrosis.       Impression:       1. Unremarkable liver.  2. Echogenic material within the inferior vena cava concerning for  partially occluding thrombus.  3. Contracted, thick-walled gallbladder without shadowing gallstones.     This report was finalized on 2/22/2018 5:41 AM by Kennedy Biswas MD.             Assessment/Plan     Patient Active Problem List   Diagnosis Code   • Acute viral hepatitis B without hepatic coma B16.9   • Hepatitis C B17.10   • Dehydration E86.0   • IV drug abuse F19.10   • Condyloma acuminata A63.0   • HPV (human papilloma virus) anogenital infection A63.0         Polly Cooley, SHANE  02/23/18  9:47 AM   Discussed with the urologist, discussed with patient and his family.  He is scheduled for follow-up with Dr. Dangelo to undergo treatment for hepatitis B and C.  Hepatic synthetic function intact.  Still some risk involved with active viral hepatitis but not an absolute contraindication to surgery.  We will sign off but available as needed.    Impression   1. Hepatitis B and C- viral load and hepatic serologies   2. Abnormal US liver- No vena cava thrombus and/or stenosis on CTA. CT did show fluid surrounding GB with ?cholecystitis. No gallstones  3. Elevated LFTs   4.  IV drug use  5. Penile condyloma- surgery required    Plan  Viral loads pending. Patient has an appointment with Dr. Yoel Dangelo on March 8, 2018   Monitor LFTs daily- relatively static  Synthetic liver function is intact and there is no obvious contraindication to urologic surgery.   GI will sign off

## 2018-02-23 NOTE — DISCHARGE SUMMARY
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 male with a history of IV drug abuse who presented with fatigue and jaundice. Please see admission H&P from 2/23/18 for further details. On admission the patient was found to be positive for hepatitis B and C.  Dr. Osullivan with gastroenterology saw the patient and recommended supportive care.  He had no evidence of liver failure.  His LFTs were stable in the 700s-800s, and INR was 1.21.  He will need to follow up with Dr Dangelo on 3/8/18 at 2018.  He has been cleared for discharge per GI and has no absolute contraindication for surgery as his coags and platelets are acceptable.  The patient was additionally found to have a giant condyloma at the base of his penis.  Dr. Cain with [facility] was consulted.  There was concern for squamous cell carcinoma, and he recommended extensive debridement which would require skin grafting following this. Inpatient coordination of this was offered but the patient opted to go home over the weekend and handle this as an outpatient.  I spoke with Dr. Antonio about the case and he has agreed to see the patient in wound clinic on 3/6/18 at 10:45AM to help coordinate this with Dr. Cain.  He will also need  "follow up with Dr. Cain next week.  I did explain that it is imperative that he quit smoking in order to ensure the best possible chance of skin graft survival.  Nicotine patches were provided.    Exam Today:  Blood pressure 123/87, pulse 62, temperature 98 °F (36.7 °C), temperature source Oral, resp. rate 16, height 177.8 cm (70\"), weight 82.6 kg (182 lb 3.2 oz), SpO2 96 %.  Constitutional: He is oriented to person, place, and time. No distress.   Head: Normocephalic and atraumatic.   Mouth/Throat: Oropharynx is clear and moist.   Eyes: Conjunctivae and EOM are normal. Pupils are equal, round, and reactive to light.   Neck: Normal range of motion. Neck supple. No JVD present.   Cardiovascular: Normal rate, regular rhythm and intact distal pulses.    Pulmonary/Chest: Effort normal and breath sounds normal.   Abdominal: Soft. Bowel sounds are normal. There is no tenderness.   Musculoskeletal: He exhibits no edema or tenderness.   Neurological: He is alert and oriented to person, place, and time. He displays no tremor (no asterixis).   : giant condyloma at the base of the penis which is almost circumferential  Skin: Skin is warm and dry. He is not diaphoretic.   Psychiatric: He has a normal mood and affect. His behavior is normal.   Nursing note and vitals reviewed.      Consults:   Consults     Date and Time Order Name Status Description    2/21/2018 0933 Inpatient Consult to Vascular Surgery Completed     2/20/2018 2331 Inpatient Consult to Gastroenterology Completed     2/20/2018 2259 Inpatient Consult to Urology Completed     2/20/2018 8246 [facility] (on-call MD unless specified) Completed            Discharge Disposition:  Home or Self Care    Discharge Medications:   Casimiro Herrera Jr.   Home Medication Instructions SONY:[id number]    Printed on:02/23/18 1550   Medication Information                      nicotine (NICODERM CQ) 21 MG/24HR patch  Place 1 patch on the skin Daily.                 Discharge Diet: "   Diet Instructions     Diet: Regular; Thin       Discharge Diet:  Regular   Fluid Consistency:  Thin                 Activity at Discharge:   Activity Instructions     Activity as Tolerated                     Follow-up Appointments:  Future Appointments  Date Time Provider Department Center   3/6/2018 10:45 AM Deepak Antonio MD  [facility]     Additional Instructions for the Follow-ups that You Need to Schedule     Discharge Follow-up with Specified Provider: Dr. Cain (Urology)    As directed    To:  Dr. Cain (Urology)    Follow Up Details:  within 1 week as scheduled           Referral to Wound Clinic    As directed    Appointment for 3/6/18 at 10:45AM   Order Comments:  Appointment for 3/6/18 at 10:45AM                      Test Results Pending at Discharge:   Order Current Status    Hepatitis B E Antibody In process    Hepatitis C Genotype In process    Hepatitis Delta Virus In process           Kennedy Boss MD  02/23/18  3:54 PM    Time Spent on Discharge Activities: Greater than 30 minutes.

## 2018-02-23 NOTE — PLAN OF CARE
Problem: Patient Care Overview (Adult)  Goal: Plan of Care Review  Outcome: Ongoing (interventions implemented as appropriate)   02/23/18 0521   Coping/Psychosocial Response Interventions   Plan Of Care Reviewed With patient   Patient Care Overview   Progress progress toward functional goals as expected   Outcome Evaluation   Outcome Summary/Follow up Plan A&Ox4. In somber mood prior while awake due to news regarding condylomata. Spoke with wife on phone and discussed importance for her to recieve testing. Slept well throughout night after taking one time dose of ambien.      Goal: Discharge Needs Assessment  Outcome: Ongoing (interventions implemented as appropriate)      Problem: Fall Risk (Adult)  Goal: Absence of Falls  Outcome: Ongoing (interventions implemented as appropriate)      Problem: Nutrition, Imbalanced: Inadequate Oral Intake (Adult)  Goal: Improved Oral Intake  Outcome: Ongoing (interventions implemented as appropriate)    Goal: Prevent Further Weight Loss  Outcome: Ongoing (interventions implemented as appropriate)

## 2018-02-23 NOTE — PROGRESS NOTES
Continued Stay Note  [facility]     Patient Name: Casimiro Herrera Jr.  MRN: [id number]  Today's Date: 2/23/2018    Admit Date: 2/20/2018          Discharge Plan       02/23/18 0901    Case Management/Social Work Plan    Plan Plan is home with his parents upon DC    Additional Comments Spoke with pt at bedside.  He states he does not have a PCP at this time.  He called [facility], but the closest MD they recommended was in [place].  [facility] appt liason contact info given.  Pt states he will be staying with his parents when he is DC'd.              Florida Hebert RN

## 2018-02-23 NOTE — CONSULTS
[facility]  Manuelito Cain MD     LOS: 3 days   Patient Care Team:  No Known Provider as PCP - General        Subjective     Interval History:     Patient Complaints: Is okay.  No penile pain.  History taken from: patient    Review of Systems:   All systems were reviewed and were negative except for penile lesions.  No shortness of air.  No chest pain.  No abdominal pain.    Objective     Vital Signs  Temp:  [97.3 °F (36.3 °C)-98.6 °F (37 °C)] 97.3 °F (36.3 °C)  Heart Rate:  [58-77] 58  Resp:  [16] 16  BP: ()/(50-87) 88/50    Physical Exam:     General Appearance:    Alert, cooperative, in no acute distress   Head:    Normocephalic, without obvious abnormality, atraumatic   Eyes:            Lids and lashes normal, conjunctivae and sclerae normal, no   icterus, no pallor, corneas clear, PERRLA   Ears:    Ears appear intact with no abnormalities noted   Throat:   No oral lesions, no thrush, oral mucosa moist   Neck:   No adenopathy, supple, trachea midline,    Back:     No kyphosis present, no scoliosis present, no skin lesions,      erythema or scars, no tenderness to percussion or                   palpation,   range of motion normal   Lungs:     Clear to auscultation,respirations regular, even and                  unlabored    Heart:    Regular rhythm and normal rate, normal S1 and S2, no            murmur, no gallop, no rub, no click   Chest Wall:    No abnormalities observed   Abdomen:     Normal bowel sounds, no masses, no organomegaly, soft        non-tender, non-distended, no guarding, no rebound                tenderness   Genital/Rectal:     Giant penile condyloma .     Extremities:   Moves all extremities well, no edema, no cyanosis, no             redness       Skin:   No bleeding, bruising or rash       Neurologic:   Cranial nerves 2 - 12 grossly intact, sensation intact,        Results Review:     I reviewed the patient's new clinical results.    Recent Results (from the past 24 hour(s))    Protime-INR    Collection Time: 02/23/18  3:25 AM   Result Value Ref Range    Protime 15.1 (H) 11.7 - 14.2 Seconds    INR 1.21 (H) 0.90 - 1.10   Comprehensive Metabolic Panel    Collection Time: 02/23/18  3:25 AM   Result Value Ref Range    Glucose 95 65 - 99 mg/dL    BUN 11 6 - 20 mg/dL    Creatinine 0.82 0.76 - 1.27 mg/dL    Sodium 133 (L) 136 - 145 mmol/L    Potassium 3.8 3.5 - 5.2 mmol/L    Chloride 104 98 - 107 mmol/L    CO2 21.7 (L) 22.0 - 29.0 mmol/L    Calcium 8.9 8.6 - 10.5 mg/dL    Total Protein 7.5 6.0 - 8.5 g/dL    Albumin 3.20 (L) 3.50 - 5.20 g/dL    ALT (SGPT) 892 (H) 1 - 41 U/L    AST (SGOT) 770 (H) 1 - 40 U/L    Alkaline Phosphatase 166 (H) 39 - 117 U/L    Total Bilirubin 2.7 (H) 0.1 - 1.2 mg/dL    eGFR Non African Amer 105 >60 mL/min/1.73    Globulin 4.3 gm/dL    A/G Ratio 0.7 g/dL    BUN/Creatinine Ratio 13.4 7.0 - 25.0    Anion Gap 7.3 mmol/L       Medication Review:   Current Facility-Administered Medications:   •  aluminum-magnesium hydroxide-simethicone (MAALOX MAX) 400-400-40 MG/5ML suspension 15 mL, 15 mL, Oral, Q6H PRN, Carmelo Herrera MD, 15 mL at 02/22/18 1952  •  nicotine (NICODERM CQ) 21 MG/24HR patch 1 patch, 1 patch, Transdermal, Q24H, Kennedy Boss MD  •  ondansetron (ZOFRAN) injection 4 mg, 4 mg, Intravenous, Q6H PRN, Carmelo Herrera MD  •  pantoprazole (PROTONIX) EC tablet 40 mg, 40 mg, Oral, QAM, Polly Cooley, APRN, 40 mg at 02/23/18 0609  •  pneumococcal polysaccharide 23-valent (PNEUMOVAX-23) vaccine 0.5 mL, 0.5 mL, Intramuscular, During Hospitalization, Stas Blackman MD  •  sodium chloride 0.9 % flush 1-10 mL, 1-10 mL, Intravenous, PRN, Stas Blackman MD  •  Insert peripheral IV, , , Once **AND** sodium chloride 0.9 % flush 10 mL, 10 mL, Intravenous, PRN, Brielle Zeng, APRN    Assessment/Plan     Principal Problem:    Acute viral hepatitis B without hepatic coma  Active Problems:    Hepatitis C    Dehydration    IV drug abuse     Condyloma acuminata    HPV (human papilloma virus) anogenital infection      Giant penile condyloma .   Plan for disposition:The patient will need extensive genital surgery including the use of skin grafting or flaps.  He will need plastic surgery consultation.In addition, with his acute hepatitis stage I don't know that he is a candidate for anesthesia at this time.     Manuelito Cain MD  02/23/18  8:56 AM      Time: >33

## 2018-02-27 LAB — HDV AB SER-ACNC: NEGATIVE

## 2018-02-28 LAB
HCV GENTYP SERPL NAA+PROBE: NORMAL
Lab: NORMAL

## 2018-03-06 ENCOUNTER — APPOINTMENT (OUTPATIENT)
Dept: WOUND CARE | Facility: HOSPITAL | Age: 38
End: 2018-03-06
Attending: SURGERY

## 2018-03-06 PROCEDURE — 97602 WOUND(S) CARE NON-SELECTIVE: CPT

## 2018-03-06 PROCEDURE — G0463 HOSPITAL OUTPT CLINIC VISIT: HCPCS
